# Patient Record
Sex: FEMALE | Race: WHITE | NOT HISPANIC OR LATINO | Employment: FULL TIME | URBAN - METROPOLITAN AREA
[De-identification: names, ages, dates, MRNs, and addresses within clinical notes are randomized per-mention and may not be internally consistent; named-entity substitution may affect disease eponyms.]

---

## 2017-05-11 ENCOUNTER — OFFICE VISIT (OUTPATIENT)
Dept: OBSTETRICS AND GYNECOLOGY | Facility: CLINIC | Age: 47
End: 2017-05-11

## 2017-05-11 VITALS
HEIGHT: 64 IN | BODY MASS INDEX: 26.46 KG/M2 | SYSTOLIC BLOOD PRESSURE: 120 MMHG | DIASTOLIC BLOOD PRESSURE: 70 MMHG | WEIGHT: 155 LBS

## 2017-05-11 DIAGNOSIS — N95.1 MENOPAUSAL SYNDROME (HOT FLUSHES): ICD-10-CM

## 2017-05-11 DIAGNOSIS — N87.0 DYSPLASIA OF CERVIX, LOW GRADE (CIN 1): Primary | ICD-10-CM

## 2017-05-11 PROCEDURE — 99213 OFFICE O/P EST LOW 20 MIN: CPT | Performed by: NURSE PRACTITIONER

## 2017-05-11 RX ORDER — HYDROXYZINE PAMOATE 25 MG/1
25 CAPSULE ORAL 3 TIMES DAILY
COMMUNITY
End: 2017-05-11 | Stop reason: SDUPTHER

## 2017-05-11 RX ORDER — LEVOTHYROXINE SODIUM 0.05 MG/1
50 TABLET ORAL
COMMUNITY
End: 2018-11-09

## 2017-05-25 ENCOUNTER — OFFICE VISIT (OUTPATIENT)
Dept: OBSTETRICS AND GYNECOLOGY | Facility: CLINIC | Age: 47
End: 2017-05-25

## 2017-05-25 VITALS
WEIGHT: 155 LBS | BODY MASS INDEX: 26.46 KG/M2 | HEIGHT: 64 IN | DIASTOLIC BLOOD PRESSURE: 78 MMHG | SYSTOLIC BLOOD PRESSURE: 126 MMHG

## 2017-05-25 DIAGNOSIS — R87.612 LOW GRADE SQUAMOUS INTRAEPITHELIAL LESION ON CYTOLOGIC SMEAR OF CERVIX (LGSIL): Primary | ICD-10-CM

## 2017-05-25 PROCEDURE — 58301 REMOVE INTRAUTERINE DEVICE: CPT | Performed by: NURSE PRACTITIONER

## 2017-05-25 PROCEDURE — 57455 BIOPSY OF CERVIX W/SCOPE: CPT | Performed by: NURSE PRACTITIONER

## 2017-05-31 LAB
DX ICD CODE: NORMAL
DX ICD CODE: NORMAL
PATH REPORT.FINAL DX SPEC: NORMAL
PATH REPORT.GROSS SPEC: NORMAL
PATH REPORT.SITE OF ORIGIN SPEC: NORMAL
PATHOLOGIST NAME: NORMAL
PAYMENT PROCEDURE: NORMAL

## 2017-06-02 LAB
HPV16 DNA CVX QL PROBE+SIG AMP: NEGATIVE
HPV18+45 E6+E7 MRNA CVX QL NAA+PROBE: NEGATIVE

## 2017-12-28 ENCOUNTER — OFFICE VISIT (OUTPATIENT)
Dept: OBSTETRICS AND GYNECOLOGY | Facility: CLINIC | Age: 47
End: 2017-12-28

## 2017-12-28 VITALS
SYSTOLIC BLOOD PRESSURE: 120 MMHG | HEIGHT: 64 IN | WEIGHT: 155 LBS | DIASTOLIC BLOOD PRESSURE: 70 MMHG | BODY MASS INDEX: 26.46 KG/M2

## 2017-12-28 DIAGNOSIS — N91.2 AMENORRHEA: Primary | ICD-10-CM

## 2017-12-28 PROCEDURE — 99213 OFFICE O/P EST LOW 20 MIN: CPT | Performed by: NURSE PRACTITIONER

## 2017-12-28 RX ORDER — NITROFURANTOIN 25; 75 MG/1; MG/1
CAPSULE ORAL
COMMUNITY
Start: 2017-12-27 | End: 2018-11-09

## 2017-12-28 RX ORDER — LEVOTHYROXINE SODIUM 0.07 MG/1
TABLET ORAL
COMMUNITY
Start: 2017-12-22 | End: 2019-10-16

## 2017-12-28 RX ORDER — ESCITALOPRAM OXALATE 10 MG/1
20 TABLET ORAL DAILY
Qty: 30 TABLET | Refills: 6 | Status: SHIPPED | OUTPATIENT
Start: 2017-12-28 | End: 2019-10-16

## 2017-12-28 RX ORDER — CYCLOBENZAPRINE HCL 5 MG
TABLET ORAL
COMMUNITY
Start: 2017-10-17 | End: 2018-11-09

## 2017-12-28 NOTE — PATIENT INSTRUCTIONS
Encounter time 20 min face to face counseling 15 min . We discussed menopause symptoms  And treat ment options

## 2017-12-28 NOTE — PROGRESS NOTES
Argelia Forte is a 47 y.o. female.   Chief Complaint   Patient presents with   • Gynecologic Exam     discuss hormones  having some hot flashes      HPI:pt has not had a period since her mirena iud was removed    The following portions of the patient's history were reviewed and updated as appropriate: allergies, current medications, past family history, past medical history, past social history, past surgical history and problem list.    Review of Systems  Review of Systems   Constitutional: Negative.    Genitourinary:        Amenorrhea   Allergic/Immunologic: Negative.    Psychiatric/Behavioral: The patient is nervous/anxious.        Objective   Physical Exam    Assessment/Plan   There are no Patient Instructions on file for this visit.    Argelia was seen today for gynecologic exam.    Diagnoses and all orders for this visit:    Amenorrhea        No Follow-up on file.

## 2017-12-29 LAB — FSH SERPL-ACNC: 59.2 MIU/ML

## 2018-02-28 ENCOUNTER — OFFICE VISIT (OUTPATIENT)
Dept: OBSTETRICS AND GYNECOLOGY | Facility: CLINIC | Age: 48
End: 2018-02-28

## 2018-02-28 VITALS
BODY MASS INDEX: 36.01 KG/M2 | WEIGHT: 155.6 LBS | SYSTOLIC BLOOD PRESSURE: 114 MMHG | HEIGHT: 55 IN | DIASTOLIC BLOOD PRESSURE: 62 MMHG

## 2018-02-28 DIAGNOSIS — Z78.0 MENOPAUSE: Primary | ICD-10-CM

## 2018-02-28 PROCEDURE — 99213 OFFICE O/P EST LOW 20 MIN: CPT | Performed by: NURSE PRACTITIONER

## 2018-02-28 RX ORDER — ESTRADIOL 1 MG/1
1 TABLET ORAL DAILY
Qty: 30 TABLET | Refills: 11 | Status: SHIPPED | OUTPATIENT
Start: 2018-02-28 | End: 2019-10-16

## 2018-02-28 NOTE — PATIENT INSTRUCTIONS
20 min encounter 15 min face to face discussing the risks and benefits  Of hrt, she has no known risks

## 2018-02-28 NOTE — PROGRESS NOTES
Argelia Forte is a 47 y.o. female.   Chief Complaint   Patient presents with   • Follow-up     Patient is here to discuss hormone labs.      HPI:pt here to discuss labs  And treatment options    The following portions of the patient's history were reviewed and updated as appropriate: allergies, current medications, past family history, past medical history, past social history, past surgical history and problem list.    Review of Systems  Review of Systems   Constitutional: Negative.    Genitourinary: Negative.    Allergic/Immunologic: Negative.    Psychiatric/Behavioral: Negative.        Objective   Physical Exam   Constitutional: She is oriented to person, place, and time. She appears well-developed and well-nourished.   Neurological: She is alert and oriented to person, place, and time.   Skin: Skin is warm.   Psychiatric: She has a normal mood and affect.   Nursing note and vitals reviewed.      Assessment/Plan   There are no Patient Instructions on file for this visit.    Argelia was seen today for follow-up.    Diagnoses and all orders for this visit:    Menopause        No Follow-up on file.

## 2019-06-25 ENCOUNTER — APPOINTMENT (OUTPATIENT)
Dept: WOMENS IMAGING | Facility: HOSPITAL | Age: 49
End: 2019-06-25

## 2019-06-25 PROCEDURE — 77067 SCR MAMMO BI INCL CAD: CPT | Performed by: RADIOLOGY

## 2019-10-16 ENCOUNTER — OFFICE VISIT (OUTPATIENT)
Dept: FAMILY MEDICINE CLINIC | Facility: CLINIC | Age: 49
End: 2019-10-16

## 2019-10-16 VITALS
HEIGHT: 64 IN | OXYGEN SATURATION: 97 % | WEIGHT: 166.2 LBS | BODY MASS INDEX: 28.38 KG/M2 | TEMPERATURE: 97.6 F | DIASTOLIC BLOOD PRESSURE: 74 MMHG | SYSTOLIC BLOOD PRESSURE: 116 MMHG | HEART RATE: 78 BPM

## 2019-10-16 DIAGNOSIS — S39.012A STRAIN OF LUMBAR PARASPINAL MUSCLE, INITIAL ENCOUNTER: Primary | ICD-10-CM

## 2019-10-16 PROCEDURE — 99213 OFFICE O/P EST LOW 20 MIN: CPT | Performed by: NURSE PRACTITIONER

## 2019-10-16 RX ORDER — CITALOPRAM 20 MG/1
TABLET ORAL
COMMUNITY
Start: 2019-10-14 | End: 2021-07-30

## 2019-10-16 NOTE — PATIENT INSTRUCTIONS
Low Back Sprain    A sprain is a stretch or tear in the bands of tissue that hold bones and joints together (ligaments). Sprains of the lower back (lumbar spine) are a common cause of low back pain. A sprain occurs when ligaments are overextended or stretched beyond their limits. The ligaments can become inflamed, resulting in pain and sudden muscle tightening (spasms). A sprain can be caused by an injury (trauma), or it can develop gradually due to overuse.  There are three types of sprains:  · Grade 1 is a mild sprain involving an overstretched ligament or a very slight tear of the ligament.  · Grade 2 is a moderate sprain involving a partial tear of the ligament.  · Grade 3 is a severe sprain involving a complete tear of the ligament.  What are the causes?  This condition may be caused by:  · Trauma, such as a fall or a hit to the body.  · Twisting or overstretching the back. This may result from doing activities that require a lot of energy, such as lifting heavy objects.  What increases the risk?  The following factors may increase your risk of getting this condition:  · Playing contact sports.  · Participating in sports or activities that put excessive stress on the back and require a lot of bending and twisting, including:  ? Lifting weights or heavy objects.  ? Gymnastics.  ? Soccer.  ? Figure skating.  ? Snowboarding.  · Being overweight or obese.  · Having poor strength and flexibility.  What are the signs or symptoms?  Symptoms of this condition may include:  · Sharp or dull pain in the lower back that does not go away. Pain may extend to the buttocks.  · Stiffness.  · Limited range of motion.  · Inability to stand up straight due to stiffness or pain.  · Muscle spasms.  How is this diagnosed?  This condition may be diagnosed based on:  · Your symptoms.  · Your medical history.  · A physical exam.  ? Your health care provider may push on certain areas of your back to determine the source of your  pain.  ? You may be asked to bend forward, backward, and side to side to assess the severity of your pain and your range of motion.  · Imaging tests, such as:  ? X-rays.  ? MRI.  How is this treated?  Treatment for this condition may include:  · Applying heat and cold to the affected area.  · Medicines to help relieve pain and to relax your muscles (muscle relaxants).  · NSAIDs to help reduce swelling and discomfort.  · Physical therapy.  When your symptoms improve, it is important to gradually return to your normal routine as soon as possible to reduce pain, avoid stiffness, and avoid loss of muscle strength. Generally, symptoms should improve within 6 weeks of treatment. However, recovery time varies.  Follow these instructions at home:  Managing pain, stiffness, and swelling    · If directed, apply ice to the injured area during the first 24 hours after your injury.  ? Put ice in a plastic bag.  ? Place a towel between your skin and the bag.  ? Leave the ice on for 20 minutes, 2-3 times a day.  · If directed, apply heat to the affected area as often as told by your health care provider. Use the heat source that your health care provider recommends, such as a moist heat pack or a heating pad.  ? Place a towel between your skin and the heat source.  ? Leave the heat on for 20-30 minutes.  ? Remove the heat if your skin turns bright red. This is especially important if you are unable to feel pain, heat, or cold. You may have a greater risk of getting burned.  Activity  · Rest and return to your normal activities as told by your health care provider. Ask your health care provider what activities are safe for you.  · Avoid activities that take a lot of effort (are strenuous) for as long as told by your health care provider.  · Do exercises as told by your health care provider.  General instructions    · Take over-the-counter and prescription medicines only as told by your health care provider.  · If you have questions  or concerns about safety while taking pain medicine, talk with your health care provider.  · Do not drive or operate heavy machinery until you know how your pain medicine affects you.  · Do not use any tobacco products, such as cigarettes, chewing tobacco, and e-cigarettes. Tobacco can delay bone healing. If you need help quitting, ask your health care provider.  · Keep all follow-up visits as told by your health care provider. This is important.  How is this prevented?  · Warm up and stretch before being active.  · Cool down and stretch after being active.  · Give your body time to rest between periods of activity.  · Avoid:  ? Being physically inactive for long periods at a time.  ? Exercising or playing sports when you are tired or in pain.  · Use correct form when playing sports and lifting heavy objects.  · Use good posture when sitting and standing.  · Maintain a healthy weight.  · Sleep on a mattress with medium firmness to support your back.  · Make sure to use equipment that fits you, including shoes that fit well.  · Be safe and responsible while being active to avoid falls.  · Do at least 150 minutes of moderate-intensity exercise each week, such as brisk walking or water aerobics. Try a form of exercise that takes stress off your back, such as swimming or stationary cycling.  · Maintain physical fitness, including:  ? Strength. In particular, develop and maintain strong abdominal muscles.  ? Flexibility.  ? Cardiovascular fitness.  ? Endurance.  Contact a health care provider if:  · Your back pain does not improve after 6 weeks of treatment.  · Your symptoms get worse.  Get help right away if:  · Your back pain is severe.  · You are unable to stand or walk.  · You develop pain in your legs.  · You develop weakness in your buttocks or legs.  · You have difficulty controlling when you urinate or when you have a bowel movement.  This information is not intended to replace advice given to you by your health  "care provider. Make sure you discuss any questions you have with your health care provider.  Document Released: 12/18/2006 Document Revised: 08/24/2017 Document Reviewed: 09/28/2016  [employer] Patient Education © 2019 [employer]      Low Back Sprain Rehab  Ask your health care provider which exercises are safe for you. Do exercises exactly as told by your health care provider and adjust them as directed. It is normal to feel mild stretching, pulling, tightness, or discomfort as you do these exercises, but you should stop right away if you feel sudden pain or your pain gets worse. Do not begin these exercises until told by your health care provider.  Stretching and range of motion exercises  These exercises warm up your muscles and joints and improve the movement and flexibility of your back. These exercises also help to relieve pain, numbness, and tingling.  Exercise A: Lumbar rotation    1. Lie on your back on a firm surface and bend your knees.  2. Straighten your arms out to your sides so each arm forms an \"L\" shape with a side of your body (a 90 degree angle).  3. Slowly move both of your knees to one side of your body until you feel a stretch in your lower back. Try not to let your shoulders move off of the floor.  4. Hold for __________ seconds.  5. Tense your abdominal muscles and slowly move your knees back to the starting position.  6. Repeat this exercise on the other side of your body.  Repeat __________ times. Complete this exercise __________ times a day.  Exercise B: Prone extension on elbows    1. Lie on your abdomen on a firm surface.  2. Prop yourself up on your elbows.  3. Use your arms to help lift your chest up until you feel a gentle stretch in your abdomen and your lower back.  ? This will place some of your body weight on your elbows. If this is uncomfortable, try stacking pillows under your chest.  ? Your hips should stay down, against the surface that you are lying on. Keep " your hip and back muscles relaxed.  4. Hold for __________ seconds.  5. Slowly relax your upper body and return to the starting position.  Repeat __________ times. Complete this exercise __________ times a day.  Strengthening exercises  These exercises build strength and endurance in your back. Endurance is the ability to use your muscles for a long time, even after they get tired.  Exercise C: Pelvic tilt  1. Lie on your back on a firm surface. Bend your knees and keep your feet flat.  2. Tense your abdominal muscles. Tip your pelvis up toward the ceiling and flatten your lower back into the floor.  ? To help with this exercise, you may place a small towel under your lower back and try to push your back into the towel.  3. Hold for __________ seconds.  4. Let your muscles relax completely before you repeat this exercise.  Repeat __________ times. Complete this exercise __________ times a day.  Exercise D: Alternating arm and leg raises    1. Get on your hands and knees on a firm surface. If you are on a hard floor, you may want to use padding to cushion your knees, such as an exercise mat.  2. Line up your arms and legs. Your hands should be below your shoulders, and your knees should be below your hips.  3. Lift your left leg behind you. At the same time, raise your right arm and straighten it in front of you.  ? Do not lift your leg higher than your hip.  ? Do not lift your arm higher than your shoulder.  ? Keep your abdominal and back muscles tight.  ? Keep your hips facing the ground.  ? Do not arch your back.  ? Keep your balance carefully, and do not hold your breath.  4. Hold for __________ seconds.  5. Slowly return to the starting position and repeat with your right leg and your left arm.  Repeat __________ times. Complete this exercise __________ times a day.  Exercise E: Abdominal set with straight leg raise    1. Lie on your back on a firm surface.  2. Bend one of your knees and keep your other leg  straight.  3. Tense your abdominal muscles and lift your straight leg up, 4-6 inches (10-15 cm) off the ground.  4. Keep your abdominal muscles tight and hold for __________ seconds.  ? Do not hold your breath.  ? Do not arch your back. Keep it flat against the ground.  5. Keep your abdominal muscles tense as you slowly lower your leg back to the starting position.  6. Repeat with your other leg.  Repeat __________ times. Complete this exercise __________ times a day.  Posture and body mechanics    Body mechanics refers to the movements and positions of your body while you do your daily activities. Posture is part of body mechanics. Good posture and healthy body mechanics can help to relieve stress in your body's tissues and joints. Good posture means that your spine is in its natural S-curve position (your spine is neutral), your shoulders are pulled back slightly, and your head is not tipped forward. The following are general guidelines for applying improved posture and body mechanics to your everyday activities.  Standing    · When standing, keep your spine neutral and your feet about hip-width apart. Keep a slight bend in your knees. Your ears, shoulders, and hips should line up.  · When you do a task in which you  one place for a long time, place one foot up on a stable object that is 2-4 inches (5-10 cm) high, such as a footstool. This helps keep your spine neutral.  Sitting    · When sitting, keep your spine neutral and keep your feet flat on the floor. Use a footrest, if necessary, and keep your thighs parallel to the floor. Avoid rounding your shoulders, and avoid tilting your head forward.  · When working at a desk or a computer, keep your desk at a height where your hands are slightly lower than your elbows. Slide your chair under your desk so you are close enough to maintain good posture.  · When working at a computer, place your monitor at a height where you are looking straight ahead and you do  not have to tilt your head forward or downward to look at the screen.  Resting    · When lying down and resting, avoid positions that are most painful for you.  · If you have pain with activities such as sitting, bending, stooping, or squatting (flexion-based activities), lie in a position in which your body does not bend very much. For example, avoid curling up on your side with your arms and knees near your chest (fetal position).  · If you have pain with activities such as standing for a long time or reaching with your arms (extension-based activities), lie with your spine in a neutral position and bend your knees slightly. Try the following positions:  · Lying on your side with a pillow between your knees.  · Lying on your back with a pillow under your knees.  Lifting    · When lifting objects, keep your feet at least shoulder-width apart and tighten your abdominal muscles.  · Bend your knees and hips and keep your spine neutral. It is important to lift using the strength of your legs, not your back. Do not lock your knees straight out.  · Always ask for help to lift heavy or awkward objects.  This information is not intended to replace advice given to you by your health care provider. Make sure you discuss any questions you have with your health care provider.  Document Released: 12/18/2006 Document Revised: 08/24/2017 Document Reviewed: 09/28/2016  [employer] Interactive Patient Education © 2019 [employer]        Use OTC ibuprofen 600mg BID   Continue use of Muscle relaxer's. Patient does not want to have a new prescription at this point.

## 2019-10-16 NOTE — PROGRESS NOTES
Subjective   Argelia Forte is a 49 y.o. female.     Chief Complaint   Patient presents with   • Back Pain     left lower side        History of Present Illness     This is my first time seeing this patient.She is a patient of Dr. Merrill.  She states she bent or twisted wrong picking up granddaughter on Saturday and it just isn't getting any better.   Pain is in Left lower side.   Denies any numbness or tingling down line.    Denies any trouble with bowel movements or urination.      Describes pain as a stiffness, sometimes worse than others.  Aching and tightness.  Rates pain at a 5 on scale of 1-10.    Alleviating factors: rest and ice  Aggravating factors: certain movements, getting out of bed. Movement of leg.    Touching it makes it worse.      otc pain patches and muscle relaxer.  Has used heat and ice.      The following portions of the patient's history were reviewed and updated as appropriate: allergies, current medications, past family history, past medical history, past social history, past surgical history and problem list.    Past Medical History:   Diagnosis Date   • Anemia    • Anxiety    • Depression    • Disease of thyroid gland    • LGSIL (low grade squamous intraepithelial dysplasia) 2016   • Menopausal symptom        Past Surgical History:   Procedure Laterality Date   •  SECTION      ; ;    • CRYOABLATION     • TUBAL ABDOMINAL LIGATION         Family History   Problem Relation Age of Onset   • Hyperlipidemia Mother    • Gallbladder disease Mother         GALLSTONES   • Hyperlipidemia Father    • Lung cancer Father    • Gallbladder disease Sister         gallstones   • No Known Problems Son    • No Known Problems Daughter    • No Known Problems Son        Social History     Socioeconomic History   • Marital status: Single     Spouse name: Not on file   • Number of children: 3   • Years of education: 12   • Highest education level: Not on file   Occupational History  "  • Occupation:      Employer: [employer]   Tobacco Use   • Smoking status: Never Smoker   • Smokeless tobacco: Never Used   Substance and Sexual Activity   • Alcohol use: No   • Drug use: No   • Sexual activity: Yes     Partners: Male     Comment: Mirena 11/23/11   Social History Narrative    OB/GYN PATIENT SINCE 2005.         Current Outpatient Medications:   •  citalopram (CeleXA) 20 MG tablet, , Disp: , Rfl:   •  hydrOXYzine (ATARAX) 25 MG tablet, Take 25 mg by mouth., Disp: , Rfl:   •  meclizine (ANTIVERT) 25 MG tablet, Take 1 tablet by mouth 3 (Three) Times a Day As Needed for dizziness., Disp: 20 tablet, Rfl: 0    Review of Systems   Constitutional: Negative for fatigue and fever.   Respiratory: Negative for cough, shortness of breath and wheezing.    Cardiovascular: Negative for chest pain.   Musculoskeletal: Positive for back pain (L lower perispinal area). Negative for neck pain.       Objective   Vitals:    10/16/19 1451   BP: 116/74   Pulse: 78   Temp: 97.6 °F (36.4 °C)   SpO2: 97%   Weight: 75.4 kg (166 lb 3.2 oz)   Height: 162.6 cm (64\")     Body mass index is 28.53 kg/m².  Physical Exam   Constitutional: She appears well-developed and well-nourished.   Cardiovascular: Normal rate, regular rhythm and normal heart sounds.   Pulmonary/Chest: Effort normal and breath sounds normal.   Musculoskeletal:        Cervical back: Normal.        Thoracic back: Normal.        Lumbar back: She exhibits tenderness (in R perispinal muscles with ROM) and spasm. She exhibits normal range of motion.         Assessment/Plan   Argelia was seen today for back pain.    Diagnoses and all orders for this visit:    Strain of lumbar paraspinal muscle, initial encounter                 Patient Instructions   Low Back Sprain    A sprain is a stretch or tear in the bands of tissue that hold bones and joints together (ligaments). Sprains of the lower back (lumbar spine) are a common cause of low back pain. A " sprain occurs when ligaments are overextended or stretched beyond their limits. The ligaments can become inflamed, resulting in pain and sudden muscle tightening (spasms). A sprain can be caused by an injury (trauma), or it can develop gradually due to overuse.  There are three types of sprains:  · Grade 1 is a mild sprain involving an overstretched ligament or a very slight tear of the ligament.  · Grade 2 is a moderate sprain involving a partial tear of the ligament.  · Grade 3 is a severe sprain involving a complete tear of the ligament.  What are the causes?  This condition may be caused by:  · Trauma, such as a fall or a hit to the body.  · Twisting or overstretching the back. This may result from doing activities that require a lot of energy, such as lifting heavy objects.  What increases the risk?  The following factors may increase your risk of getting this condition:  · Playing contact sports.  · Participating in sports or activities that put excessive stress on the back and require a lot of bending and twisting, including:  ? Lifting weights or heavy objects.  ? Gymnastics.  ? Soccer.  ? Figure skating.  ? Snowboarding.  · Being overweight or obese.  · Having poor strength and flexibility.  What are the signs or symptoms?  Symptoms of this condition may include:  · Sharp or dull pain in the lower back that does not go away. Pain may extend to the buttocks.  · Stiffness.  · Limited range of motion.  · Inability to stand up straight due to stiffness or pain.  · Muscle spasms.  How is this diagnosed?  This condition may be diagnosed based on:  · Your symptoms.  · Your medical history.  · A physical exam.  ? Your health care provider may push on certain areas of your back to determine the source of your pain.  ? You may be asked to bend forward, backward, and side to side to assess the severity of your pain and your range of motion.  · Imaging tests, such as:  ? X-rays.  ? MRI.  How is this treated?  Treatment  for this condition may include:  · Applying heat and cold to the affected area.  · Medicines to help relieve pain and to relax your muscles (muscle relaxants).  · NSAIDs to help reduce swelling and discomfort.  · Physical therapy.  When your symptoms improve, it is important to gradually return to your normal routine as soon as possible to reduce pain, avoid stiffness, and avoid loss of muscle strength. Generally, symptoms should improve within 6 weeks of treatment. However, recovery time varies.  Follow these instructions at home:  Managing pain, stiffness, and swelling    · If directed, apply ice to the injured area during the first 24 hours after your injury.  ? Put ice in a plastic bag.  ? Place a towel between your skin and the bag.  ? Leave the ice on for 20 minutes, 2-3 times a day.  · If directed, apply heat to the affected area as often as told by your health care provider. Use the heat source that your health care provider recommends, such as a moist heat pack or a heating pad.  ? Place a towel between your skin and the heat source.  ? Leave the heat on for 20-30 minutes.  ? Remove the heat if your skin turns bright red. This is especially important if you are unable to feel pain, heat, or cold. You may have a greater risk of getting burned.  Activity  · Rest and return to your normal activities as told by your health care provider. Ask your health care provider what activities are safe for you.  · Avoid activities that take a lot of effort (are strenuous) for as long as told by your health care provider.  · Do exercises as told by your health care provider.  General instructions    · Take over-the-counter and prescription medicines only as told by your health care provider.  · If you have questions or concerns about safety while taking pain medicine, talk with your health care provider.  · Do not drive or operate heavy machinery until you know how your pain medicine affects you.  · Do not use any tobacco  products, such as cigarettes, chewing tobacco, and e-cigarettes. Tobacco can delay bone healing. If you need help quitting, ask your health care provider.  · Keep all follow-up visits as told by your health care provider. This is important.  How is this prevented?  · Warm up and stretch before being active.  · Cool down and stretch after being active.  · Give your body time to rest between periods of activity.  · Avoid:  ? Being physically inactive for long periods at a time.  ? Exercising or playing sports when you are tired or in pain.  · Use correct form when playing sports and lifting heavy objects.  · Use good posture when sitting and standing.  · Maintain a healthy weight.  · Sleep on a mattress with medium firmness to support your back.  · Make sure to use equipment that fits you, including shoes that fit well.  · Be safe and responsible while being active to avoid falls.  · Do at least 150 minutes of moderate-intensity exercise each week, such as brisk walking or water aerobics. Try a form of exercise that takes stress off your back, such as swimming or stationary cycling.  · Maintain physical fitness, including:  ? Strength. In particular, develop and maintain strong abdominal muscles.  ? Flexibility.  ? Cardiovascular fitness.  ? Endurance.  Contact a health care provider if:  · Your back pain does not improve after 6 weeks of treatment.  · Your symptoms get worse.  Get help right away if:  · Your back pain is severe.  · You are unable to stand or walk.  · You develop pain in your legs.  · You develop weakness in your buttocks or legs.  · You have difficulty controlling when you urinate or when you have a bowel movement.  This information is not intended to replace advice given to you by your health care provider. Make sure you discuss any questions you have with your health care provider.  Document Released: 12/18/2006 Document Revised: 08/24/2017 Document Reviewed: 09/28/2016  [employer]  "Patient Education © 2019 [employer]      Low Back Sprain Rehab  Ask your health care provider which exercises are safe for you. Do exercises exactly as told by your health care provider and adjust them as directed. It is normal to feel mild stretching, pulling, tightness, or discomfort as you do these exercises, but you should stop right away if you feel sudden pain or your pain gets worse. Do not begin these exercises until told by your health care provider.  Stretching and range of motion exercises  These exercises warm up your muscles and joints and improve the movement and flexibility of your back. These exercises also help to relieve pain, numbness, and tingling.  Exercise A: Lumbar rotation    1. Lie on your back on a firm surface and bend your knees.  2. Straighten your arms out to your sides so each arm forms an \"L\" shape with a side of your body (a 90 degree angle).  3. Slowly move both of your knees to one side of your body until you feel a stretch in your lower back. Try not to let your shoulders move off of the floor.  4. Hold for __________ seconds.  5. Tense your abdominal muscles and slowly move your knees back to the starting position.  6. Repeat this exercise on the other side of your body.  Repeat __________ times. Complete this exercise __________ times a day.  Exercise B: Prone extension on elbows    1. Lie on your abdomen on a firm surface.  2. Prop yourself up on your elbows.  3. Use your arms to help lift your chest up until you feel a gentle stretch in your abdomen and your lower back.  ? This will place some of your body weight on your elbows. If this is uncomfortable, try stacking pillows under your chest.  ? Your hips should stay down, against the surface that you are lying on. Keep your hip and back muscles relaxed.  4. Hold for __________ seconds.  5. Slowly relax your upper body and return to the starting position.  Repeat __________ times. Complete this exercise __________ times a " day.  Strengthening exercises  These exercises build strength and endurance in your back. Endurance is the ability to use your muscles for a long time, even after they get tired.  Exercise C: Pelvic tilt  1. Lie on your back on a firm surface. Bend your knees and keep your feet flat.  2. Tense your abdominal muscles. Tip your pelvis up toward the ceiling and flatten your lower back into the floor.  ? To help with this exercise, you may place a small towel under your lower back and try to push your back into the towel.  3. Hold for __________ seconds.  4. Let your muscles relax completely before you repeat this exercise.  Repeat __________ times. Complete this exercise __________ times a day.  Exercise D: Alternating arm and leg raises    1. Get on your hands and knees on a firm surface. If you are on a hard floor, you may want to use padding to cushion your knees, such as an exercise mat.  2. Line up your arms and legs. Your hands should be below your shoulders, and your knees should be below your hips.  3. Lift your left leg behind you. At the same time, raise your right arm and straighten it in front of you.  ? Do not lift your leg higher than your hip.  ? Do not lift your arm higher than your shoulder.  ? Keep your abdominal and back muscles tight.  ? Keep your hips facing the ground.  ? Do not arch your back.  ? Keep your balance carefully, and do not hold your breath.  4. Hold for __________ seconds.  5. Slowly return to the starting position and repeat with your right leg and your left arm.  Repeat __________ times. Complete this exercise __________ times a day.  Exercise E: Abdominal set with straight leg raise    1. Lie on your back on a firm surface.  2. Bend one of your knees and keep your other leg straight.  3. Tense your abdominal muscles and lift your straight leg up, 4-6 inches (10-15 cm) off the ground.  4. Keep your abdominal muscles tight and hold for __________ seconds.  ? Do not hold your  breath.  ? Do not arch your back. Keep it flat against the ground.  5. Keep your abdominal muscles tense as you slowly lower your leg back to the starting position.  6. Repeat with your other leg.  Repeat __________ times. Complete this exercise __________ times a day.  Posture and body mechanics    Body mechanics refers to the movements and positions of your body while you do your daily activities. Posture is part of body mechanics. Good posture and healthy body mechanics can help to relieve stress in your body's tissues and joints. Good posture means that your spine is in its natural S-curve position (your spine is neutral), your shoulders are pulled back slightly, and your head is not tipped forward. The following are general guidelines for applying improved posture and body mechanics to your everyday activities.  Standing    · When standing, keep your spine neutral and your feet about hip-width apart. Keep a slight bend in your knees. Your ears, shoulders, and hips should line up.  · When you do a task in which you  one place for a long time, place one foot up on a stable object that is 2-4 inches (5-10 cm) high, such as a footstool. This helps keep your spine neutral.  Sitting    · When sitting, keep your spine neutral and keep your feet flat on the floor. Use a footrest, if necessary, and keep your thighs parallel to the floor. Avoid rounding your shoulders, and avoid tilting your head forward.  · When working at a desk or a computer, keep your desk at a height where your hands are slightly lower than your elbows. Slide your chair under your desk so you are close enough to maintain good posture.  · When working at a computer, place your monitor at a height where you are looking straight ahead and you do not have to tilt your head forward or downward to look at the screen.  Resting    · When lying down and resting, avoid positions that are most painful for you.  · If you have pain with activities such  as sitting, bending, stooping, or squatting (flexion-based activities), lie in a position in which your body does not bend very much. For example, avoid curling up on your side with your arms and knees near your chest (fetal position).  · If you have pain with activities such as standing for a long time or reaching with your arms (extension-based activities), lie with your spine in a neutral position and bend your knees slightly. Try the following positions:  · Lying on your side with a pillow between your knees.  · Lying on your back with a pillow under your knees.  Lifting    · When lifting objects, keep your feet at least shoulder-width apart and tighten your abdominal muscles.  · Bend your knees and hips and keep your spine neutral. It is important to lift using the strength of your legs, not your back. Do not lock your knees straight out.  · Always ask for help to lift heavy or awkward objects.  This information is not intended to replace advice given to you by your health care provider. Make sure you discuss any questions you have with your health care provider.  Document Released: 12/18/2006 Document Revised: 08/24/2017 Document Reviewed: 09/28/2016  [employer] Patient Education © 2019 [employer]        Use OTC ibuprofen 600mg BID   Continue use of Muscle relaxer's. Patient does not want to have a new prescription at this point.

## 2020-04-24 ENCOUNTER — TELEPHONE (OUTPATIENT)
Dept: FAMILY MEDICINE CLINIC | Facility: CLINIC | Age: 50
End: 2020-04-24

## 2020-04-24 RX ORDER — LEVOTHYROXINE SODIUM 88 UG/1
TABLET ORAL
Qty: 90 TABLET | Refills: 0 | OUTPATIENT
Start: 2020-04-24

## 2020-11-04 ENCOUNTER — OFFICE VISIT (OUTPATIENT)
Dept: FAMILY MEDICINE CLINIC | Facility: CLINIC | Age: 50
End: 2020-11-04

## 2020-11-04 VITALS
HEIGHT: 64 IN | OXYGEN SATURATION: 98 % | BODY MASS INDEX: 29.19 KG/M2 | DIASTOLIC BLOOD PRESSURE: 82 MMHG | TEMPERATURE: 97.7 F | SYSTOLIC BLOOD PRESSURE: 124 MMHG | WEIGHT: 171 LBS | HEART RATE: 71 BPM

## 2020-11-04 DIAGNOSIS — M26.621 ARTHRALGIA OF RIGHT TEMPOROMANDIBULAR JOINT: Primary | ICD-10-CM

## 2020-11-04 DIAGNOSIS — M12.811 ROTATOR CUFF ARTHROPATHY, RIGHT: ICD-10-CM

## 2020-11-04 PROCEDURE — 99213 OFFICE O/P EST LOW 20 MIN: CPT | Performed by: FAMILY MEDICINE

## 2020-11-04 NOTE — PROGRESS NOTES
Subjective   Argelia Forte is a 50 y.o. female.     Chief Complaint   Patient presents with   • Earache     Right Ear   • Shoulder Pain     Right Shoulder       Patient is here today with a new problem.  She started with right ear pain about 2 weeks ago.  She states that it has been intermittent and she has noticed a lot of drainage.  She denies any fevers, chills or any other upper respiratory symptoms.  She denies any hearing loss or ringing in her ears.  She states that it does hurt worse when she is chewing gum.    She is also having another new problem.  She started with right shoulder pain a couple weeks ago.  It has been intermittent.  It mostly hurts when she reaches overhead or in front of her.  It also hurts when she is lifting something with her right arm.  She denies any pain at rest.  She denies any redness or swelling or known trauma or injury.       Review of Systems   Constitutional: Negative for activity change, chills, fatigue and fever.   HENT: Positive for ear pain (right). Negative for hearing loss, swollen glands, tinnitus and trouble swallowing.    Eyes: Negative for pain and visual disturbance.   Respiratory: Negative for cough and shortness of breath.    Cardiovascular: Negative for chest pain, palpitations and leg swelling.   Gastrointestinal: Negative for diarrhea and nausea.   Endocrine: Negative for polydipsia and polyuria.   Genitourinary: Negative for difficulty urinating and urinary incontinence.   Musculoskeletal: Negative for arthralgias, gait problem and joint swelling.   Skin: Negative for rash.   Allergic/Immunologic: Negative for immunocompromised state.   Neurological: Negative for dizziness, light-headedness and headache.   Hematological: Negative for adenopathy. Does not bruise/bleed easily.   Psychiatric/Behavioral: Negative for dysphoric mood and sleep disturbance.       The following portions of the patient's history were reviewed and updated as appropriate: allergies,  "current medications, past family history, past medical history, past social history, past surgical history and problem list.    Past Medical History:   Diagnosis Date   • Anemia    • Anxiety    • Depression    • Disease of thyroid gland    • LGSIL (low grade squamous intraepithelial dysplasia) 2016   • Menopausal symptom        Past Surgical History:   Procedure Laterality Date   •  SECTION      ; ;    • CRYOABLATION     • TUBAL ABDOMINAL LIGATION         Family History   Problem Relation Age of Onset   • Hyperlipidemia Mother    • Gallbladder disease Mother         GALLSTONES   • Hyperlipidemia Father    • Lung cancer Father    • Gallbladder disease Sister         gallstones   • No Known Problems Son    • No Known Problems Daughter    • No Known Problems Son        Social History     Socioeconomic History   • Marital status: Single     Spouse name: Not on file   • Number of children: 3   • Years of education: 12   • Highest education level: Not on file   Occupational History   • Occupation:      Employer: [employer]   Tobacco Use   • Smoking status: Never Smoker   • Smokeless tobacco: Never Used   Substance and Sexual Activity   • Alcohol use: No   • Drug use: No   • Sexual activity: Yes     Partners: Male     Comment: Mirena 9   Social History Narrative    OB/GYN PATIENT SINCE .         Current Outpatient Medications:   •  citalopram (CeleXA) 20 MG tablet, , Disp: , Rfl:   •  hydrOXYzine (ATARAX) 25 MG tablet, Take 25 mg by mouth., Disp: , Rfl:   •  meclizine (ANTIVERT) 25 MG tablet, Take 1 tablet by mouth 3 (Three) Times a Day As Needed for dizziness., Disp: 20 tablet, Rfl: 0    Objective     Vitals:    20 1005   BP: 124/82   Pulse: 71   Temp: 97.7 °F (36.5 °C)   SpO2: 98%   Weight: 77.6 kg (171 lb)   Height: 162.6 cm (64\")       Body mass index is 29.35 kg/m².    No components found for: 2D    Physical Exam  Vitals signs and nursing " note reviewed.   Constitutional:       Appearance: She is well-developed.   HENT:      Head: Normocephalic and atraumatic.      Right Ear: External ear normal.      Left Ear: External ear normal.      Nose: Nose normal.   Eyes:      General: No scleral icterus.     Conjunctiva/sclera: Conjunctivae normal.   Neck:      Musculoskeletal: Normal range of motion and neck supple.   Cardiovascular:      Rate and Rhythm: Normal rate and regular rhythm.      Heart sounds: Normal heart sounds.   Pulmonary:      Effort: Pulmonary effort is normal.      Breath sounds: Normal breath sounds.   Lymphadenopathy:      Cervical: No cervical adenopathy.   Skin:     General: Skin is warm and dry.      Findings: No rash.   Neurological:      Mental Status: She is alert and oriented to person, place, and time.   Psychiatric:         Mood and Affect: Mood normal.         Behavior: Behavior normal.         Thought Content: Thought content normal.         Judgment: Judgment normal.         Procedures    Assessment/Plan   Diagnoses and all orders for this visit:    1. Arthralgia of right temporomandibular joint (Primary)    2. Rotator cuff arthropathy, right    I have advised the patient to do daily strengthening exercises for her right shoulder pain.  If her symptoms fail to improve or worsen she should contact me.    I have also advised the patient to use ice and discuss TMJ with her dentist and consider a bite guard if her right ear and jaw pain continue.  She may use over-the-counter anti-inflammatory medications as well.    Patient Instructions   Temporomandibular Joint Syndrome    Temporomandibular joint syndrome (TMJ syndrome) is a condition that causes pain in the temporomandibular joints. These joints are located near your ears and allow your jaw to open and close. For people with TMJ syndrome, chewing, biting, or other movements of the jaw can be difficult or painful.  TMJ syndrome is often mild and goes away within a few weeks.  However, sometimes the condition becomes a long-term (chronic) problem.  What are the causes?  This condition may be caused by:  · Grinding your teeth or clenching your jaw. Some people do this when they are under stress.  · Arthritis.  · Injury to the jaw.  · Head or neck injury.  · Teeth or dentures that are not aligned well.  In some cases, the cause of TMJ syndrome may not be known.  What are the signs or symptoms?  The most common symptom of this condition is an aching pain on the side of the head in the area of the TMJ. Other symptoms may include:  · Pain when moving your jaw, such as when chewing or biting.  · Being unable to open your jaw all the way.  · Making a clicking sound when you open your mouth.  · Headache.  · Earache.  · Neck or shoulder pain.  How is this diagnosed?  This condition may be diagnosed based on:  · Your symptoms and medical history.  · A physical exam. Your health care provider may check the range of motion of your jaw.  · Imaging tests, such as X-rays or an MRI.  You may also need to see your dentist, who will determine if your teeth and jaw are lined up correctly.  How is this treated?  TMJ syndrome often goes away on its own. If treatment is needed, the options may include:  · Eating soft foods and applying ice or heat.  · Medicines to relieve pain or inflammation.  · Medicines or massage to relax the muscles.  · A splint, bite plate, or mouthpiece to prevent teeth grinding or jaw clenching.  · Relaxation techniques or counseling to help reduce stress.  · A therapy for pain in which an electrical current is applied to the nerves through the skin (transcutaneous electrical nerve stimulation).  · Acupuncture. This is sometimes helpful to relieve pain.  · Jaw surgery. This is rarely needed.  Follow these instructions at home:    Eating and drinking  · Eat a soft diet if you are having trouble chewing.  · Avoid foods that require a lot of chewing. Do not chew gum.  General  instructions  · Take over-the-counter and prescription medicines only as told by your health care provider.  · If directed, put ice on the painful area.  ? Put ice in a plastic bag.  ? Place a towel between your skin and the bag.  ? Leave the ice on for 20 minutes, 2-3 times a day.  · Apply a warm, wet cloth (warm compress) to the painful area as directed.  · Massage your jaw area and do any jaw stretching exercises as told by your health care provider.  · If you were given a splint, bite plate, or mouthpiece, wear it as told by your health care provider.  · Keep all follow-up visits as told by your health care provider. This is important.  Contact a health care provider if:  · You are having trouble eating.  · You have new or worsening symptoms.  Get help right away if:  · Your jaw locks open or closed.  Summary  · Temporomandibular joint syndrome (TMJ syndrome) is a condition that causes pain in the temporomandibular joints. These joints are located near your ears and allow your jaw to open and close.  · TMJ syndrome is often mild and goes away within a few weeks. However, sometimes the condition becomes a long-term (chronic) problem.  · Symptoms include an aching pain on the side of the head in the area of the TMJ, pain when chewing or biting, and being unable to open your jaw all the way. You may also make a clicking sound when you open your mouth.  · TMJ syndrome often goes away on its own. If treatment is needed, it may include medicines to relieve pain, reduce inflammation, or relax the muscles. A splint, bite plate, or mouthpiece may also be used to prevent teeth grinding or jaw clenching.  This information is not intended to replace advice given to you by your health care provider. Make sure you discuss any questions you have with your health care provider.  Document Released: 09/12/2002 Document Revised: 03/01/2019 Document Reviewed: 01/29/2019  [employer]vier Patient Education © 2020 [employer]  Shoulder  Exercises  Ask your health care provider which exercises are safe for you. Do exercises exactly as told by your health care provider and adjust them as directed. It is normal to feel mild stretching, pulling, tightness, or discomfort as you do these exercises. Stop right away if you feel sudden pain or your pain gets worse. Do not begin these exercises until told by your health care provider.  Stretching exercises  External rotation and abduction  This exercise is sometimes called corner stretch. This exercise rotates your arm outward (external rotation) and moves your arm out from your body (abduction).  1.  a doorway with one of your feet slightly in front of the other. This is called a staggered stance. If you cannot reach your forearms to the door frame, stand facing a corner of a room.  2. Choose one of the following positions as told by your health care provider:  ? Place your hands and forearms on the door frame above your head.  ? Place your hands and forearms on the door frame at the height of your head.  ? Place your hands on the door frame at the height of your elbows.  3. Slowly move your weight onto your front foot until you feel a stretch across your chest and in the front of your shoulders. Keep your head and chest upright and keep your abdominal muscles tight.  4. Hold for __________ seconds.  5. To release the stretch, shift your weight to your back foot.  Repeat __________ times. Complete this exercise __________ times a day.  Extension, standing  1. Stand and hold a broomstick, a cane, or a similar object behind your back.  ? Your hands should be a little wider than shoulder width apart.  ? Your palms should face away from your back.  2. Keeping your elbows straight and your shoulder muscles relaxed, move the stick away from your body until you feel a stretch in your shoulders (extension).  ? Avoid shrugging your shoulders while you move the stick. Keep your shoulder blades tucked down  toward the middle of your back.  3. Hold for __________ seconds.  4. Slowly return to the starting position.  Repeat __________ times. Complete this exercise __________ times a day.  Range-of-motion exercises  Pendulum    1. Stand near a wall or a surface that you can hold onto for balance.  2. Bend at the waist and let your left / right arm hang straight down. Use your other arm to support you. Keep your back straight and do not lock your knees.  3. Relax your left / right arm and shoulder muscles, and move your hips and your trunk so your left / right arm swings freely. Your arm should swing because of the motion of your body, not because you are using your arm or shoulder muscles.  4. Keep moving your hips and trunk so your arm swings in the following directions, as told by your health care provider:  ? Side to side.  ? Forward and backward.  ? In clockwise and counterclockwise circles.  5. Continue each motion for __________ seconds, or for as long as told by your health care provider.  6. Slowly return to the starting position.  Repeat __________ times. Complete this exercise __________ times a day.  Shoulder flexion, standing    1. Stand and hold a broomstick, a cane, or a similar object. Place your hands a little more than shoulder width apart on the object. Your left / right hand should be palm up, and your other hand should be palm down.  2. Keep your elbow straight and your shoulder muscles relaxed. Push the stick up with your healthy arm to raise your left / right arm in front of your body, and then over your head until you feel a stretch in your shoulder (flexion).  ? Avoid shrugging your shoulder while you raise your arm. Keep your shoulder blade tucked down toward the middle of your back.  3. Hold for __________ seconds.  4. Slowly return to the starting position.  Repeat __________ times. Complete this exercise __________ times a day.  Shoulder abduction, standing  1. Stand and hold a broomstick, a  cane, or a similar object. Place your hands a little more than shoulder width apart on the object. Your left / right hand should be palm up, and your other hand should be palm down.  2. Keep your elbow straight and your shoulder muscles relaxed. Push the object across your body toward your left / right side. Raise your left / right arm to the side of your body (abduction) until you feel a stretch in your shoulder.  ? Do not raise your arm above shoulder height unless your health care provider tells you to do that.  ? If directed, raise your arm over your head.  ? Avoid shrugging your shoulder while you raise your arm. Keep your shoulder blade tucked down toward the middle of your back.  3. Hold for __________ seconds.  4. Slowly return to the starting position.  Repeat __________ times. Complete this exercise __________ times a day.  Internal rotation    1. Place your left / right hand behind your back, palm up.  2. Use your other hand to dangle an exercise band, a towel, or a similar object over your shoulder. Grasp the band with your left / right hand so you are holding on to both ends.  3. Gently pull up on the band until you feel a stretch in the front of your left / right shoulder. The movement of your arm toward the center of your body is called internal rotation.  ? Avoid shrugging your shoulder while you raise your arm. Keep your shoulder blade tucked down toward the middle of your back.  4. Hold for __________ seconds.  5. Release the stretch by letting go of the band and lowering your hands.  Repeat __________ times. Complete this exercise __________ times a day.  Strengthening exercises  External rotation    1. Sit in a stable chair without armrests.  2. Secure an exercise band to a stable object at elbow height on your left / right side.  3. Place a soft object, such as a folded towel or a small pillow, between your left / right upper arm and your body to move your elbow about 4 inches (10 cm) away from  your side.  4. Hold the end of the exercise band so it is tight and there is no slack.  5. Keeping your elbow pressed against the soft object, slowly move your forearm out, away from your abdomen (external rotation). Keep your body steady so only your forearm moves.  6. Hold for __________ seconds.  7. Slowly return to the starting position.  Repeat __________ times. Complete this exercise __________ times a day.  Shoulder abduction    1. Sit in a stable chair without armrests, or stand up.  2. Hold a __________ weight in your left / right hand, or hold an exercise band with both hands.  3. Start with your arms straight down and your left / right palm facing in, toward your body.  4. Slowly lift your left / right hand out to your side (abduction). Do not lift your hand above shoulder height unless your health care provider tells you that this is safe.  ? Keep your arms straight.  ? Avoid shrugging your shoulder while you do this movement. Keep your shoulder blade tucked down toward the middle of your back.  5. Hold for __________ seconds.  6. Slowly lower your arm, and return to the starting position.  Repeat __________ times. Complete this exercise __________ times a day.  Shoulder extension  1. Sit in a stable chair without armrests, or stand up.  2. Secure an exercise band to a stable object in front of you so it is at shoulder height.  3. Hold one end of the exercise band in each hand. Your palms should face each other.  4. Straighten your elbows and lift your hands up to shoulder height.  5. Step back, away from the secured end of the exercise band, until the band is tight and there is no slack.  6. Squeeze your shoulder blades together as you pull your hands down to the sides of your thighs (extension). Stop when your hands are straight down by your sides. Do not let your hands go behind your body.  7. Hold for __________ seconds.  8. Slowly return to the starting position.  Repeat __________ times. Complete  this exercise __________ times a day.  Shoulder row  1. Sit in a stable chair without armrests, or stand up.  2. Secure an exercise band to a stable object in front of you so it is at waist height.  3. Hold one end of the exercise band in each hand. Position your palms so that your thumbs are facing the ceiling (neutral position).  4. Bend each of your elbows to a 90-degree angle (right angle) and keep your upper arms at your sides.  5. Step back until the band is tight and there is no slack.  6. Slowly pull your elbows back behind you.  7. Hold for __________ seconds.  8. Slowly return to the starting position.  Repeat __________ times. Complete this exercise __________ times a day.  Shoulder press-ups    1. Sit in a stable chair that has armrests. Sit upright, with your feet flat on the floor.  2. Put your hands on the armrests so your elbows are bent and your fingers are pointing forward. Your hands should be about even with the sides of your body.  3. Push down on the armrests and use your arms to lift yourself off the chair. Straighten your elbows and lift yourself up as much as you comfortably can.  ? Move your shoulder blades down, and avoid letting your shoulders move up toward your ears.  ? Keep your feet on the ground. As you get stronger, your feet should support less of your body weight as you lift yourself up.  4. Hold for __________ seconds.  5. Slowly lower yourself back into the chair.  Repeat __________ times. Complete this exercise __________ times a day.  Wall push-ups    1. Stand so you are facing a stable wall. Your feet should be about one arm-length away from the wall.  2. Lean forward and place your palms on the wall at shoulder height.  3. Keep your feet flat on the floor as you bend your elbows and lean forward toward the wall.  4. Hold for __________ seconds.  5. Straighten your elbows to push yourself back to the starting position.  Repeat __________ times. Complete this exercise  __________ times a day.  This information is not intended to replace advice given to you by your health care provider. Make sure you discuss any questions you have with your health care provider.  Document Released: 11/01/2006 Document Revised: 04/10/2020 Document Reviewed: 01/17/2020  [employer] Patient Education © 2020 [employer]

## 2020-11-04 NOTE — PATIENT INSTRUCTIONS
Temporomandibular Joint Syndrome    Temporomandibular joint syndrome (TMJ syndrome) is a condition that causes pain in the temporomandibular joints. These joints are located near your ears and allow your jaw to open and close. For people with TMJ syndrome, chewing, biting, or other movements of the jaw can be difficult or painful.  TMJ syndrome is often mild and goes away within a few weeks. However, sometimes the condition becomes a long-term (chronic) problem.  What are the causes?  This condition may be caused by:  · Grinding your teeth or clenching your jaw. Some people do this when they are under stress.  · Arthritis.  · Injury to the jaw.  · Head or neck injury.  · Teeth or dentures that are not aligned well.  In some cases, the cause of TMJ syndrome may not be known.  What are the signs or symptoms?  The most common symptom of this condition is an aching pain on the side of the head in the area of the TMJ. Other symptoms may include:  · Pain when moving your jaw, such as when chewing or biting.  · Being unable to open your jaw all the way.  · Making a clicking sound when you open your mouth.  · Headache.  · Earache.  · Neck or shoulder pain.  How is this diagnosed?  This condition may be diagnosed based on:  · Your symptoms and medical history.  · A physical exam. Your health care provider may check the range of motion of your jaw.  · Imaging tests, such as X-rays or an MRI.  You may also need to see your dentist, who will determine if your teeth and jaw are lined up correctly.  How is this treated?  TMJ syndrome often goes away on its own. If treatment is needed, the options may include:  · Eating soft foods and applying ice or heat.  · Medicines to relieve pain or inflammation.  · Medicines or massage to relax the muscles.  · A splint, bite plate, or mouthpiece to prevent teeth grinding or jaw clenching.  · Relaxation techniques or counseling to help reduce stress.  · A therapy for pain in which an  electrical current is applied to the nerves through the skin (transcutaneous electrical nerve stimulation).  · Acupuncture. This is sometimes helpful to relieve pain.  · Jaw surgery. This is rarely needed.  Follow these instructions at home:    Eating and drinking  · Eat a soft diet if you are having trouble chewing.  · Avoid foods that require a lot of chewing. Do not chew gum.  General instructions  · Take over-the-counter and prescription medicines only as told by your health care provider.  · If directed, put ice on the painful area.  ? Put ice in a plastic bag.  ? Place a towel between your skin and the bag.  ? Leave the ice on for 20 minutes, 2-3 times a day.  · Apply a warm, wet cloth (warm compress) to the painful area as directed.  · Massage your jaw area and do any jaw stretching exercises as told by your health care provider.  · If you were given a splint, bite plate, or mouthpiece, wear it as told by your health care provider.  · Keep all follow-up visits as told by your health care provider. This is important.  Contact a health care provider if:  · You are having trouble eating.  · You have new or worsening symptoms.  Get help right away if:  · Your jaw locks open or closed.  Summary  · Temporomandibular joint syndrome (TMJ syndrome) is a condition that causes pain in the temporomandibular joints. These joints are located near your ears and allow your jaw to open and close.  · TMJ syndrome is often mild and goes away within a few weeks. However, sometimes the condition becomes a long-term (chronic) problem.  · Symptoms include an aching pain on the side of the head in the area of the TMJ, pain when chewing or biting, and being unable to open your jaw all the way. You may also make a clicking sound when you open your mouth.  · TMJ syndrome often goes away on its own. If treatment is needed, it may include medicines to relieve pain, reduce inflammation, or relax the muscles. A splint, bite plate, or  mouthpiece may also be used to prevent teeth grinding or jaw clenching.  This information is not intended to replace advice given to you by your health care provider. Make sure you discuss any questions you have with your health care provider.  Document Released: 09/12/2002 Document Revised: 03/01/2019 Document Reviewed: 01/29/2019  [employer] Patient Education © 2020 [employer]  Shoulder Exercises  Ask your health care provider which exercises are safe for you. Do exercises exactly as told by your health care provider and adjust them as directed. It is normal to feel mild stretching, pulling, tightness, or discomfort as you do these exercises. Stop right away if you feel sudden pain or your pain gets worse. Do not begin these exercises until told by your health care provider.  Stretching exercises  External rotation and abduction  This exercise is sometimes called corner stretch. This exercise rotates your arm outward (external rotation) and moves your arm out from your body (abduction).  1.  a doorway with one of your feet slightly in front of the other. This is called a staggered stance. If you cannot reach your forearms to the door frame, stand facing a corner of a room.  2. Choose one of the following positions as told by your health care provider:  ? Place your hands and forearms on the door frame above your head.  ? Place your hands and forearms on the door frame at the height of your head.  ? Place your hands on the door frame at the height of your elbows.  3. Slowly move your weight onto your front foot until you feel a stretch across your chest and in the front of your shoulders. Keep your head and chest upright and keep your abdominal muscles tight.  4. Hold for __________ seconds.  5. To release the stretch, shift your weight to your back foot.  Repeat __________ times. Complete this exercise __________ times a day.  Extension, standing  1. Stand and hold a broomstick, a cane, or a similar  object behind your back.  ? Your hands should be a little wider than shoulder width apart.  ? Your palms should face away from your back.  2. Keeping your elbows straight and your shoulder muscles relaxed, move the stick away from your body until you feel a stretch in your shoulders (extension).  ? Avoid shrugging your shoulders while you move the stick. Keep your shoulder blades tucked down toward the middle of your back.  3. Hold for __________ seconds.  4. Slowly return to the starting position.  Repeat __________ times. Complete this exercise __________ times a day.  Range-of-motion exercises  Pendulum    1. Stand near a wall or a surface that you can hold onto for balance.  2. Bend at the waist and let your left / right arm hang straight down. Use your other arm to support you. Keep your back straight and do not lock your knees.  3. Relax your left / right arm and shoulder muscles, and move your hips and your trunk so your left / right arm swings freely. Your arm should swing because of the motion of your body, not because you are using your arm or shoulder muscles.  4. Keep moving your hips and trunk so your arm swings in the following directions, as told by your health care provider:  ? Side to side.  ? Forward and backward.  ? In clockwise and counterclockwise circles.  5. Continue each motion for __________ seconds, or for as long as told by your health care provider.  6. Slowly return to the starting position.  Repeat __________ times. Complete this exercise __________ times a day.  Shoulder flexion, standing    1. Stand and hold a broomstick, a cane, or a similar object. Place your hands a little more than shoulder width apart on the object. Your left / right hand should be palm up, and your other hand should be palm down.  2. Keep your elbow straight and your shoulder muscles relaxed. Push the stick up with your healthy arm to raise your left / right arm in front of your body, and then over your head  until you feel a stretch in your shoulder (flexion).  ? Avoid shrugging your shoulder while you raise your arm. Keep your shoulder blade tucked down toward the middle of your back.  3. Hold for __________ seconds.  4. Slowly return to the starting position.  Repeat __________ times. Complete this exercise __________ times a day.  Shoulder abduction, standing  1. Stand and hold a broomstick, a cane, or a similar object. Place your hands a little more than shoulder width apart on the object. Your left / right hand should be palm up, and your other hand should be palm down.  2. Keep your elbow straight and your shoulder muscles relaxed. Push the object across your body toward your left / right side. Raise your left / right arm to the side of your body (abduction) until you feel a stretch in your shoulder.  ? Do not raise your arm above shoulder height unless your health care provider tells you to do that.  ? If directed, raise your arm over your head.  ? Avoid shrugging your shoulder while you raise your arm. Keep your shoulder blade tucked down toward the middle of your back.  3. Hold for __________ seconds.  4. Slowly return to the starting position.  Repeat __________ times. Complete this exercise __________ times a day.  Internal rotation    1. Place your left / right hand behind your back, palm up.  2. Use your other hand to dangle an exercise band, a towel, or a similar object over your shoulder. Grasp the band with your left / right hand so you are holding on to both ends.  3. Gently pull up on the band until you feel a stretch in the front of your left / right shoulder. The movement of your arm toward the center of your body is called internal rotation.  ? Avoid shrugging your shoulder while you raise your arm. Keep your shoulder blade tucked down toward the middle of your back.  4. Hold for __________ seconds.  5. Release the stretch by letting go of the band and lowering your hands.  Repeat __________ times.  Complete this exercise __________ times a day.  Strengthening exercises  External rotation    1. Sit in a stable chair without armrests.  2. Secure an exercise band to a stable object at elbow height on your left / right side.  3. Place a soft object, such as a folded towel or a small pillow, between your left / right upper arm and your body to move your elbow about 4 inches (10 cm) away from your side.  4. Hold the end of the exercise band so it is tight and there is no slack.  5. Keeping your elbow pressed against the soft object, slowly move your forearm out, away from your abdomen (external rotation). Keep your body steady so only your forearm moves.  6. Hold for __________ seconds.  7. Slowly return to the starting position.  Repeat __________ times. Complete this exercise __________ times a day.  Shoulder abduction    1. Sit in a stable chair without armrests, or stand up.  2. Hold a __________ weight in your left / right hand, or hold an exercise band with both hands.  3. Start with your arms straight down and your left / right palm facing in, toward your body.  4. Slowly lift your left / right hand out to your side (abduction). Do not lift your hand above shoulder height unless your health care provider tells you that this is safe.  ? Keep your arms straight.  ? Avoid shrugging your shoulder while you do this movement. Keep your shoulder blade tucked down toward the middle of your back.  5. Hold for __________ seconds.  6. Slowly lower your arm, and return to the starting position.  Repeat __________ times. Complete this exercise __________ times a day.  Shoulder extension  1. Sit in a stable chair without armrests, or stand up.  2. Secure an exercise band to a stable object in front of you so it is at shoulder height.  3. Hold one end of the exercise band in each hand. Your palms should face each other.  4. Straighten your elbows and lift your hands up to shoulder height.  5. Step back, away from the  secured end of the exercise band, until the band is tight and there is no slack.  6. Squeeze your shoulder blades together as you pull your hands down to the sides of your thighs (extension). Stop when your hands are straight down by your sides. Do not let your hands go behind your body.  7. Hold for __________ seconds.  8. Slowly return to the starting position.  Repeat __________ times. Complete this exercise __________ times a day.  Shoulder row  1. Sit in a stable chair without armrests, or stand up.  2. Secure an exercise band to a stable object in front of you so it is at waist height.  3. Hold one end of the exercise band in each hand. Position your palms so that your thumbs are facing the ceiling (neutral position).  4. Bend each of your elbows to a 90-degree angle (right angle) and keep your upper arms at your sides.  5. Step back until the band is tight and there is no slack.  6. Slowly pull your elbows back behind you.  7. Hold for __________ seconds.  8. Slowly return to the starting position.  Repeat __________ times. Complete this exercise __________ times a day.  Shoulder press-ups    1. Sit in a stable chair that has armrests. Sit upright, with your feet flat on the floor.  2. Put your hands on the armrests so your elbows are bent and your fingers are pointing forward. Your hands should be about even with the sides of your body.  3. Push down on the armrests and use your arms to lift yourself off the chair. Straighten your elbows and lift yourself up as much as you comfortably can.  ? Move your shoulder blades down, and avoid letting your shoulders move up toward your ears.  ? Keep your feet on the ground. As you get stronger, your feet should support less of your body weight as you lift yourself up.  4. Hold for __________ seconds.  5. Slowly lower yourself back into the chair.  Repeat __________ times. Complete this exercise __________ times a day.  Wall push-ups    1. Stand so you are facing a  stable wall. Your feet should be about one arm-length away from the wall.  2. Lean forward and place your palms on the wall at shoulder height.  3. Keep your feet flat on the floor as you bend your elbows and lean forward toward the wall.  4. Hold for __________ seconds.  5. Straighten your elbows to push yourself back to the starting position.  Repeat __________ times. Complete this exercise __________ times a day.  This information is not intended to replace advice given to you by your health care provider. Make sure you discuss any questions you have with your health care provider.  Document Released: 11/01/2006 Document Revised: 04/10/2020 Document Reviewed: 01/17/2020  [employer] Patient Education © 2020 [employer]

## 2021-03-10 ENCOUNTER — OFFICE VISIT (OUTPATIENT)
Dept: FAMILY MEDICINE CLINIC | Facility: CLINIC | Age: 51
End: 2021-03-10

## 2021-03-10 VITALS
SYSTOLIC BLOOD PRESSURE: 118 MMHG | HEIGHT: 64 IN | BODY MASS INDEX: 29.26 KG/M2 | RESPIRATION RATE: 18 BRPM | TEMPERATURE: 97.7 F | WEIGHT: 171.4 LBS | HEART RATE: 77 BPM | DIASTOLIC BLOOD PRESSURE: 76 MMHG | OXYGEN SATURATION: 96 %

## 2021-03-10 DIAGNOSIS — E06.3 HYPOTHYROIDISM DUE TO HASHIMOTO'S THYROIDITIS: Primary | Chronic | ICD-10-CM

## 2021-03-10 DIAGNOSIS — E03.8 HYPOTHYROIDISM DUE TO HASHIMOTO'S THYROIDITIS: Primary | Chronic | ICD-10-CM

## 2021-03-10 PROBLEM — F41.9 ANXIETY: Status: ACTIVE | Noted: 2021-03-10

## 2021-03-10 PROCEDURE — 99213 OFFICE O/P EST LOW 20 MIN: CPT | Performed by: FAMILY MEDICINE

## 2021-03-10 RX ORDER — OLANZAPINE 2.5 MG/1
TABLET ORAL
COMMUNITY
Start: 2021-03-04 | End: 2023-03-07 | Stop reason: SDDI

## 2021-03-10 NOTE — PROGRESS NOTES
"Chief Complaint  Hypothyroidism    Subjective          Argelia Forte presents to [facility]  Patient is here today to follow-up on hypothyroidism.  Currently she is not taking her medication.  She has had intermittent bouts of noncompliance in the past.  She ran out of levothyroxine about 6 months ago.  She has not been experiencing any worsening fatigue, swelling or numbness.  She has seen some recent some weight gain.      Objective   Vital Signs:   /76 (BP Location: Left arm, Patient Position: Sitting, Cuff Size: Adult)   Pulse 77   Temp 97.7 °F (36.5 °C) (Temporal)   Resp 18   Ht 162.6 cm (64.02\")   Wt 77.7 kg (171 lb 6.4 oz)   SpO2 96%   BMI 29.41 kg/m²     Physical Exam  Vitals and nursing note reviewed.   Constitutional:       Appearance: Normal appearance. She is well-developed.   HENT:      Head: Normocephalic and atraumatic.   Eyes:      General: No scleral icterus.     Conjunctiva/sclera: Conjunctivae normal.   Cardiovascular:      Rate and Rhythm: Normal rate and regular rhythm.      Heart sounds: Normal heart sounds.   Pulmonary:      Effort: Pulmonary effort is normal.      Breath sounds: Normal breath sounds.   Musculoskeletal:         General: Normal range of motion.      Cervical back: Normal range of motion and neck supple.   Skin:     General: Skin is warm and dry.      Capillary Refill: Capillary refill takes less than 2 seconds.      Findings: No rash.   Neurological:      Mental Status: She is alert and oriented to person, place, and time.   Psychiatric:         Mood and Affect: Mood normal.         Behavior: Behavior normal.         Thought Content: Thought content normal.         Judgment: Judgment normal.        Result Review :   The following data was reviewed by: Radha Merrill DO on 03/10/2021:                    Assessment and Plan    Diagnoses and all orders for this visit:    1. Hypothyroidism due to Hashimoto's thyroiditis (Primary)  -     " TSH    Patient here today to follow-up on hypothyroidism. Surveillance labs were obtained and medication changes will be made based on lab results and will be called to the patient later this week.      Follow Up   Return in about 2 months (around 5/10/2021) for thyroid.  Patient was given instructions and counseling regarding her condition or for health maintenance advice. Please see specific information pulled into the AVS if appropriate.

## 2021-03-11 DIAGNOSIS — E03.8 HYPOTHYROIDISM DUE TO HASHIMOTO'S THYROIDITIS: Primary | ICD-10-CM

## 2021-03-11 DIAGNOSIS — E06.3 HYPOTHYROIDISM DUE TO HASHIMOTO'S THYROIDITIS: Primary | ICD-10-CM

## 2021-03-11 LAB — TSH SERPL DL<=0.005 MIU/L-ACNC: 13.4 UIU/ML (ref 0.45–4.5)

## 2021-03-11 RX ORDER — LEVOTHYROXINE SODIUM 0.05 MG/1
50 TABLET ORAL DAILY
Qty: 90 TABLET | Refills: 0 | Status: SHIPPED | OUTPATIENT
Start: 2021-03-11 | End: 2021-05-11 | Stop reason: SDUPTHER

## 2021-05-10 ENCOUNTER — OFFICE VISIT (OUTPATIENT)
Dept: FAMILY MEDICINE CLINIC | Facility: CLINIC | Age: 51
End: 2021-05-10

## 2021-05-10 VITALS
SYSTOLIC BLOOD PRESSURE: 122 MMHG | WEIGHT: 169.6 LBS | HEIGHT: 64 IN | BODY MASS INDEX: 28.95 KG/M2 | HEART RATE: 76 BPM | OXYGEN SATURATION: 99 % | TEMPERATURE: 97.7 F | DIASTOLIC BLOOD PRESSURE: 64 MMHG

## 2021-05-10 DIAGNOSIS — E03.8 HYPOTHYROIDISM DUE TO HASHIMOTO'S THYROIDITIS: Primary | ICD-10-CM

## 2021-05-10 DIAGNOSIS — E06.3 HYPOTHYROIDISM DUE TO HASHIMOTO'S THYROIDITIS: Primary | ICD-10-CM

## 2021-05-10 PROCEDURE — 99214 OFFICE O/P EST MOD 30 MIN: CPT | Performed by: FAMILY MEDICINE

## 2021-05-10 NOTE — PROGRESS NOTES
"Chief Complaint  Hypothyroidism    Subjective          Argelia Forte presents to [facility]  Patient is here today to follow-up on hypothyroidism.  Patient has a history of hypothyroidism and stopped her medication on her own prior to her last visit.  She started back on levothyroxine 50 mcg daily 2 months ago.  Patient admits that she has not been consistently taking the medication daily but at least takes it 3-4 times a week.  She feels the dose is accurate      Objective   Vital Signs:   /64   Pulse 76   Temp 97.7 °F (36.5 °C)   Ht 162.6 cm (64.02\")   Wt 76.9 kg (169 lb 9.6 oz)   SpO2 99%   BMI 29.10 kg/m²     Physical Exam  Vitals and nursing note reviewed.   Constitutional:       Appearance: Normal appearance. She is well-developed.   HENT:      Head: Normocephalic and atraumatic.   Eyes:      Conjunctiva/sclera: Conjunctivae normal.   Cardiovascular:      Rate and Rhythm: Normal rate and regular rhythm.      Heart sounds: Normal heart sounds.   Pulmonary:      Effort: Pulmonary effort is normal.      Breath sounds: Normal breath sounds.   Musculoskeletal:         General: Normal range of motion.      Cervical back: Normal range of motion and neck supple.   Skin:     General: Skin is warm and dry.      Capillary Refill: Capillary refill takes less than 2 seconds.      Findings: No rash.   Neurological:      Mental Status: She is alert and oriented to person, place, and time.   Psychiatric:         Mood and Affect: Mood normal.         Behavior: Behavior normal.         Thought Content: Thought content normal.         Judgment: Judgment normal.        Result Review :   The following data was reviewed by: Radha Merrill DO on 05/10/2021:      TSH    TSH 3/10/21   TSH 13.400 (A)   (A) Abnormal value                      Assessment and Plan    Diagnoses and all orders for this visit:    1. Hypothyroidism due to Hashimoto's thyroiditis (Primary)  -     TSH    Patient is " here today for uncontrolled hypothyroidism.  Surveillance labs were obtained today and any medication changes will be made based on lab results and will be called to the patient later this week.      Follow Up   Return in about 3 months (around 8/10/2021) for Thyroid.  Patient was given instructions and counseling regarding her condition or for health maintenance advice. Please see specific information pulled into the AVS if appropriate.

## 2021-05-11 DIAGNOSIS — E03.8 HYPOTHYROIDISM DUE TO HASHIMOTO'S THYROIDITIS: ICD-10-CM

## 2021-05-11 DIAGNOSIS — E06.3 HYPOTHYROIDISM DUE TO HASHIMOTO'S THYROIDITIS: ICD-10-CM

## 2021-05-11 LAB — TSH SERPL DL<=0.005 MIU/L-ACNC: 8.5 UIU/ML (ref 0.27–4.2)

## 2021-05-11 RX ORDER — LEVOTHYROXINE SODIUM 0.07 MG/1
75 TABLET ORAL DAILY
Qty: 90 TABLET | Refills: 1 | Status: SHIPPED | OUTPATIENT
Start: 2021-05-11 | End: 2022-03-18 | Stop reason: SDUPTHER

## 2021-11-24 RX ORDER — LEVOTHYROXINE SODIUM 0.05 MG/1
TABLET ORAL
Qty: 30 TABLET | Refills: 0 | Status: SHIPPED | OUTPATIENT
Start: 2021-11-24 | End: 2022-03-16 | Stop reason: DRUGHIGH

## 2022-03-16 ENCOUNTER — OFFICE VISIT (OUTPATIENT)
Dept: FAMILY MEDICINE CLINIC | Facility: CLINIC | Age: 52
End: 2022-03-16

## 2022-03-16 VITALS
BODY MASS INDEX: 28.27 KG/M2 | HEART RATE: 70 BPM | TEMPERATURE: 97.1 F | HEIGHT: 64 IN | OXYGEN SATURATION: 99 % | DIASTOLIC BLOOD PRESSURE: 70 MMHG | WEIGHT: 165.6 LBS | SYSTOLIC BLOOD PRESSURE: 100 MMHG

## 2022-03-16 DIAGNOSIS — E06.3 HYPOTHYROIDISM DUE TO HASHIMOTO'S THYROIDITIS: Primary | ICD-10-CM

## 2022-03-16 DIAGNOSIS — H60.501 ACUTE OTITIS EXTERNA OF RIGHT EAR, UNSPECIFIED TYPE: ICD-10-CM

## 2022-03-16 DIAGNOSIS — E03.8 HYPOTHYROIDISM DUE TO HASHIMOTO'S THYROIDITIS: Primary | ICD-10-CM

## 2022-03-16 PROCEDURE — 99214 OFFICE O/P EST MOD 30 MIN: CPT | Performed by: FAMILY MEDICINE

## 2022-03-16 RX ORDER — NEOMYCIN SULFATE, POLYMYXIN B SULFATE AND HYDROCORTISONE 10; 3.5; 1 MG/ML; MG/ML; [USP'U]/ML
3 SUSPENSION/ DROPS AURICULAR (OTIC) 4 TIMES DAILY
Qty: 10 ML | Refills: 0 | Status: SHIPPED | OUTPATIENT
Start: 2022-03-16 | End: 2022-05-16

## 2022-03-16 NOTE — PROGRESS NOTES
"Chief Complaint  Hypothyroidism and Earache    Subjective          Argelia Forte presents to [facility]  Patient is here today to follow-up on hypothyroidism.  Patient has a history of noncompliance with medication.  It has been 10 months since her last visit.  Patient has not taken levothyroxine for couple of months.  She ran out.  Her last dosage was 75 mcg daily.    Patient is also here for an acute new problem.  She states her right ear has been aching the last few days intermittently.  She denies any hearing problems or discharge from the ear.  She states that it is tender to touch but that it feels better today than yesterday.      Objective   Vital Signs:   /70   Pulse 70   Temp 97.1 °F (36.2 °C)   Ht 162.6 cm (64\")   Wt 75.1 kg (165 lb 9.6 oz)   SpO2 99%   BMI 28.43 kg/m²     Physical Exam  Vitals and nursing note reviewed.   Constitutional:       Appearance: Normal appearance. She is well-developed.   HENT:      Head: Normocephalic and atraumatic.      Right Ear: Hearing, tympanic membrane and external ear normal.      Left Ear: Hearing, tympanic membrane, ear canal and external ear normal.      Ears:      Comments: Slightly erythematous right EAC.     Nose: Nose normal.   Eyes:      General: No scleral icterus.     Conjunctiva/sclera: Conjunctivae normal.   Cardiovascular:      Rate and Rhythm: Normal rate and regular rhythm.      Heart sounds: Normal heart sounds.   Pulmonary:      Effort: Pulmonary effort is normal.      Breath sounds: Normal breath sounds.   Musculoskeletal:      Cervical back: Normal range of motion and neck supple.   Lymphadenopathy:      Cervical: No cervical adenopathy.   Skin:     General: Skin is warm and dry.      Findings: No rash.   Neurological:      Mental Status: She is alert and oriented to person, place, and time.   Psychiatric:         Mood and Affect: Mood normal.         Behavior: Behavior normal.         Thought Content: " Thought content normal.         Judgment: Judgment normal.        Result Review :   The following data was reviewed by: Radha Merrill DO on 03/16/2022:      TSH    TSH 5/10/21   TSH 8.500 (A)   (A) Abnormal value                      Assessment and Plan    Diagnoses and all orders for this visit:    1. Hypothyroidism due to Hashimoto's thyroiditis (Primary)  -     TSH    2. Acute otitis externa of right ear, unspecified type  -     neomycin-polymyxin-hydrocortisone (CORTISPORIN) 3.5-82744-8 otic suspension; Administer 3 drops to the right ear 4 (Four) Times a Day.  Dispense: 10 mL; Refill: 0    Patient is here today to follow-up on uncontrolled hypothyroidism.  Labs will be performed today and levothyroxine will be restarted based on lab results.    Patient is also here for new problem of right otitis externa.  Rx was sent to preferred pharmacy.  If symptoms or not improving follow-up.      Follow Up   Return in about 2 months (around 5/16/2022).  Patient was given instructions and counseling regarding her condition or for health maintenance advice. Please see specific information pulled into the AVS if appropriate.

## 2022-03-17 LAB — TSH SERPL DL<=0.005 MIU/L-ACNC: 9.9 UIU/ML (ref 0.45–4.5)

## 2022-03-18 DIAGNOSIS — E03.8 HYPOTHYROIDISM DUE TO HASHIMOTO'S THYROIDITIS: ICD-10-CM

## 2022-03-18 DIAGNOSIS — E06.3 HYPOTHYROIDISM DUE TO HASHIMOTO'S THYROIDITIS: ICD-10-CM

## 2022-03-18 RX ORDER — LEVOTHYROXINE SODIUM 0.05 MG/1
50 TABLET ORAL DAILY
Qty: 30 TABLET | Refills: 1 | Status: SHIPPED | OUTPATIENT
Start: 2022-03-18 | End: 2022-06-08

## 2022-05-16 ENCOUNTER — OFFICE VISIT (OUTPATIENT)
Dept: FAMILY MEDICINE CLINIC | Facility: CLINIC | Age: 52
End: 2022-05-16

## 2022-05-16 VITALS
OXYGEN SATURATION: 98 % | SYSTOLIC BLOOD PRESSURE: 112 MMHG | DIASTOLIC BLOOD PRESSURE: 66 MMHG | TEMPERATURE: 98.6 F | WEIGHT: 166.6 LBS | HEART RATE: 78 BPM | HEIGHT: 64 IN | BODY MASS INDEX: 28.44 KG/M2

## 2022-05-16 DIAGNOSIS — E03.8 HYPOTHYROIDISM DUE TO HASHIMOTO'S THYROIDITIS: Primary | ICD-10-CM

## 2022-05-16 DIAGNOSIS — E06.3 HYPOTHYROIDISM DUE TO HASHIMOTO'S THYROIDITIS: Primary | ICD-10-CM

## 2022-05-16 PROCEDURE — 99213 OFFICE O/P EST LOW 20 MIN: CPT | Performed by: FAMILY MEDICINE

## 2022-05-16 NOTE — PROGRESS NOTES
"Chief Complaint  Hypothyroidism    Subjective          Argelia Forte presents to [facility]  Patient is here today to follow-up on hypothyroidism.  Patient has a history of noncompliance with medication.  Patient was restarted on levothyroxine 50 mcg daily 2 months ago.  Last dose was 3 days ago.      Objective   Vital Signs:  /66   Pulse 78   Temp 98.6 °F (37 °C)   Ht 162.6 cm (64\")   Wt 75.6 kg (166 lb 9.6 oz)   SpO2 98%   BMI 28.60 kg/m²           Physical Exam  Vitals and nursing note reviewed.   Constitutional:       Appearance: Normal appearance. She is well-developed.   HENT:      Head: Normocephalic and atraumatic.   Eyes:      General: No scleral icterus.     Conjunctiva/sclera: Conjunctivae normal.   Cardiovascular:      Rate and Rhythm: Normal rate and regular rhythm.      Heart sounds: Normal heart sounds.   Pulmonary:      Effort: Pulmonary effort is normal.      Breath sounds: Normal breath sounds.   Musculoskeletal:         General: Normal range of motion.      Cervical back: Normal range of motion and neck supple.      Right lower leg: No edema.      Left lower leg: No edema.   Skin:     General: Skin is warm and dry.      Capillary Refill: Capillary refill takes less than 2 seconds.      Findings: No rash.   Neurological:      Mental Status: She is alert and oriented to person, place, and time.   Psychiatric:         Mood and Affect: Mood normal.         Behavior: Behavior normal.         Thought Content: Thought content normal.         Judgment: Judgment normal.        Result Review :   The following data was reviewed by: Radha Merrill DO on 05/16/2022:      TSH    TSH 3/16/22   TSH 9.900 (A)   (A) Abnormal value                      Assessment and Plan    Diagnoses and all orders for this visit:    1. Hypothyroidism due to Hashimoto's thyroiditis (Primary)  -     TSH      Patient is here to follow-up on chronic stable hypothyroidism.  Surveillance labs " were obtained today and any medication changes will be made based on lab results and will be called to the patient later this week.       Follow Up   Return in about 3 months (around 8/16/2022) for Thyroid.  Patient was given instructions and counseling regarding her condition or for health maintenance advice. Please see specific information pulled into the AVS if appropriate.

## 2022-05-17 LAB — TSH SERPL DL<=0.005 MIU/L-ACNC: 7.95 UIU/ML (ref 0.45–4.5)

## 2022-06-08 DIAGNOSIS — E06.3 HYPOTHYROIDISM DUE TO HASHIMOTO'S THYROIDITIS: ICD-10-CM

## 2022-06-08 DIAGNOSIS — E03.8 HYPOTHYROIDISM DUE TO HASHIMOTO'S THYROIDITIS: ICD-10-CM

## 2022-06-08 RX ORDER — LEVOTHYROXINE SODIUM 0.05 MG/1
50 TABLET ORAL DAILY
Qty: 90 TABLET | Refills: 0 | Status: SHIPPED | OUTPATIENT
Start: 2022-06-08 | End: 2022-12-05 | Stop reason: SDUPTHER

## 2022-12-05 ENCOUNTER — OFFICE VISIT (OUTPATIENT)
Dept: FAMILY MEDICINE CLINIC | Facility: CLINIC | Age: 52
End: 2022-12-05

## 2022-12-05 VITALS
TEMPERATURE: 97.7 F | WEIGHT: 162.8 LBS | BODY MASS INDEX: 27.79 KG/M2 | SYSTOLIC BLOOD PRESSURE: 102 MMHG | DIASTOLIC BLOOD PRESSURE: 64 MMHG | HEART RATE: 75 BPM | HEIGHT: 64 IN | OXYGEN SATURATION: 99 %

## 2022-12-05 DIAGNOSIS — E06.3 HYPOTHYROIDISM DUE TO HASHIMOTO'S THYROIDITIS: Primary | ICD-10-CM

## 2022-12-05 DIAGNOSIS — E03.8 HYPOTHYROIDISM DUE TO HASHIMOTO'S THYROIDITIS: Primary | ICD-10-CM

## 2022-12-05 PROCEDURE — 99213 OFFICE O/P EST LOW 20 MIN: CPT | Performed by: FAMILY MEDICINE

## 2022-12-05 RX ORDER — LEVOTHYROXINE SODIUM 0.05 MG/1
50 TABLET ORAL DAILY
Qty: 90 TABLET | Refills: 1 | Status: SHIPPED | OUTPATIENT
Start: 2022-12-05 | End: 2023-03-07 | Stop reason: SDUPTHER

## 2022-12-05 NOTE — PROGRESS NOTES
"Answers for HPI/ROS submitted by the patient on 12/4/2022  Please describe your symptoms.: Thyroid  Have you had these symptoms before?: Yes  How long have you been having these symptoms?: Greater than 2 weeks  Please list any medications you are currently taking for this condition.: Thyroid medicine  What is the primary reason for your visit?: Other    Chief Complaint  Med Refill and Hypothyroidism    Subjective        Argelia Forte presents to [facility]  History of Present Illness  The patient is a patient of Dr. Radha Merrill, and this is my first time seeing her today.    Hashimoto's disease  The patient states she need her thyroid checked and she is out of her thyroid medication. She states she was diagnosed with thyroid disease in her 20's. She states she was unaware of the Hashimoto's diagnosis until she saw a specialist in [place].  She was at one point overtreated and notes made her hesitant to treat her thyroid appropriately.  They thought she had Graves' disease at 1 point when she was getting too much thyroid medicine and it triggered her anxiety.    Objective   Vital Signs:  /64   Pulse 75   Temp 97.7 °F (36.5 °C)   Ht 162.6 cm (64\")   Wt 73.8 kg (162 lb 12.8 oz)   SpO2 99%   BMI 27.94 kg/m²   Estimated body mass index is 27.94 kg/m² as calculated from the following:    Height as of this encounter: 162.6 cm (64\").    Weight as of this encounter: 73.8 kg (162 lb 12.8 oz).          Physical Exam  Vitals reviewed: No swelling today.   Constitutional:       General: She is not in acute distress.     Appearance: Normal appearance. She is well-developed.   HENT:      Head: Normocephalic and atraumatic.      Right Ear: Tympanic membrane, ear canal and external ear normal.      Left Ear: Tympanic membrane, ear canal and external ear normal.      Mouth/Throat:      Mouth: Mucous membranes are moist.      Pharynx: Oropharynx is clear.   Eyes:      Conjunctiva/sclera: " Conjunctivae normal.      Pupils: Pupils are equal, round, and reactive to light.   Neck:      Thyroid: No thyromegaly.   Cardiovascular:      Rate and Rhythm: Normal rate and regular rhythm.      Heart sounds: No murmur heard.  Pulmonary:      Effort: Pulmonary effort is normal.      Breath sounds: Normal breath sounds. No wheezing.   Abdominal:      General: Bowel sounds are normal.      Palpations: Abdomen is soft.      Tenderness: There is no abdominal tenderness.   Musculoskeletal:         General: Normal range of motion.      Cervical back: Neck supple.   Lymphadenopathy:      Cervical: No cervical adenopathy.   Skin:     General: Skin is warm and dry.   Neurological:      Mental Status: She is alert and oriented to person, place, and time.   Psychiatric:         Mood and Affect: Mood normal.         Behavior: Behavior normal.        Result Review :                Assessment and Plan   Diagnoses and all orders for this visit:    1. Hypothyroidism due to Hashimoto's thyroiditis (Primary)  -     TSH  -     levothyroxine (SYNTHROID, LEVOTHROID) 50 MCG tablet; Take 1 tablet by mouth Daily.  Dispense: 90 tablet; Refill: 1      1. Hypothyroidism.  -We will check a TSH today.   -We will restart the patient on levothyroxine 50 mcg.   -She will follow up in 3 months.  Make sure to be taking medication at follow-up.       Follow Up   Return in about 3 months (around 3/5/2023) for Recheck thyroid with Dr. Merrill.  Patient was given instructions and counseling regarding her condition or for health maintenance advice. Please see specific information pulled into the AVS if appropriate.     Transcribed from ambient dictation for Meredith Lea Kehrer, MD by Nicole Ruelas.  12/05/22   11:31 EST    Patient or patient representative verbalized consent to the visit recording.

## 2022-12-06 LAB — TSH SERPL DL<=0.005 MIU/L-ACNC: 11.2 UIU/ML (ref 0.27–4.2)

## 2023-03-07 ENCOUNTER — OFFICE VISIT (OUTPATIENT)
Dept: FAMILY MEDICINE CLINIC | Facility: CLINIC | Age: 53
End: 2023-03-07
Payer: COMMERCIAL

## 2023-03-07 VITALS
TEMPERATURE: 97.8 F | OXYGEN SATURATION: 96 % | RESPIRATION RATE: 16 BRPM | HEIGHT: 64 IN | HEART RATE: 80 BPM | DIASTOLIC BLOOD PRESSURE: 80 MMHG | SYSTOLIC BLOOD PRESSURE: 116 MMHG | BODY MASS INDEX: 26.63 KG/M2 | WEIGHT: 156 LBS

## 2023-03-07 DIAGNOSIS — E06.3 HYPOTHYROIDISM DUE TO HASHIMOTO'S THYROIDITIS: ICD-10-CM

## 2023-03-07 DIAGNOSIS — F32.A ANXIETY AND DEPRESSION: ICD-10-CM

## 2023-03-07 DIAGNOSIS — F41.9 ANXIETY AND DEPRESSION: ICD-10-CM

## 2023-03-07 DIAGNOSIS — E06.3 HYPOTHYROIDISM DUE TO HASHIMOTO'S THYROIDITIS: Primary | ICD-10-CM

## 2023-03-07 DIAGNOSIS — E03.8 HYPOTHYROIDISM DUE TO HASHIMOTO'S THYROIDITIS: ICD-10-CM

## 2023-03-07 DIAGNOSIS — E03.8 HYPOTHYROIDISM DUE TO HASHIMOTO'S THYROIDITIS: Primary | ICD-10-CM

## 2023-03-07 LAB — TSH SERPL DL<=0.005 MIU/L-ACNC: 5.93 UIU/ML (ref 0.27–4.2)

## 2023-03-07 PROCEDURE — 99214 OFFICE O/P EST MOD 30 MIN: CPT | Performed by: FAMILY MEDICINE

## 2023-03-07 RX ORDER — LEVOTHYROXINE SODIUM 0.05 MG/1
50 TABLET ORAL DAILY
Qty: 90 TABLET | Refills: 1 | Status: SHIPPED | OUTPATIENT
Start: 2023-03-07

## 2023-03-07 NOTE — PROGRESS NOTES
"Answers for HPI/ROS submitted by the patient on 3/7/2023  Please describe your symptoms.: Having thyroid checked  Have you had these symptoms before?: Yes  How long have you been having these symptoms?: Greater than 2 weeks  Please list any medications you are currently taking for this condition.: Thyroid med  What is the primary reason for your visit?: Other    Chief Complaint  Hypothyroidism    Subjective        Argelia Forte presents to [facility]  History of Present Illness  Patient is here today to follow-up on her chronic health conditions:    Hypothyroidism.  Patient currently is taking levothyroxine 50 mcg daily.  Unfortunately the patient has a history of noncompliance with her medication and follow-up.  Patient has missed about 2 doses weekly on average of the levothyroxine since her last appointment in December 2022.    Depression and anxiety.  Currently the patient is not taking sertraline or hydroxyzine or Zyprexa.  She states that those medications never seem to help.  She would rather not take any new medication at this time.  She feels that her symptoms are well controlled.  She denies SI or HI          Objective   Vital Signs:  /80 (BP Location: Left arm, Patient Position: Sitting, Cuff Size: Adult)   Pulse 80   Temp 97.8 °F (36.6 °C) (Temporal)   Resp 16   Ht 162.6 cm (64.02\")   Wt 70.8 kg (156 lb)   SpO2 96%   BMI 26.76 kg/m²   Estimated body mass index is 26.76 kg/m² as calculated from the following:    Height as of this encounter: 162.6 cm (64.02\").    Weight as of this encounter: 70.8 kg (156 lb).             Physical Exam  Vitals and nursing note reviewed.   Constitutional:       Appearance: Normal appearance. She is well-developed.   HENT:      Head: Normocephalic and atraumatic.      Right Ear: External ear normal.      Left Ear: External ear normal.      Nose: Nose normal.   Eyes:      General: No scleral icterus.     Conjunctiva/sclera: " Conjunctivae normal.   Cardiovascular:      Rate and Rhythm: Normal rate and regular rhythm.      Heart sounds: Normal heart sounds.   Pulmonary:      Effort: Pulmonary effort is normal.      Breath sounds: Normal breath sounds.   Musculoskeletal:      Cervical back: Normal range of motion and neck supple.      Right lower leg: No edema.      Left lower leg: No edema.   Lymphadenopathy:      Cervical: No cervical adenopathy.   Skin:     General: Skin is warm and dry.      Findings: No rash.   Neurological:      Mental Status: She is alert and oriented to person, place, and time.   Psychiatric:         Mood and Affect: Mood normal.         Behavior: Behavior normal.         Thought Content: Thought content normal.         Judgment: Judgment normal.        Result Review :  The following data was reviewed by: Radha Merrill DO on 03/07/2023:      TSH    TSH 3/16/22 5/16/22 12/5/22   TSH 9.900 (A) 7.950 (A) 11.200 (A)   (A) Abnormal value                     Assessment and Plan   Diagnoses and all orders for this visit:    1. Hypothyroidism due to Hashimoto's thyroiditis (Primary)  -     TSH    2. Anxiety and depression    Patient is here today for chronic stable hypothyroidism. Surveillance labs were obtained today and any medication changes will be made based on lab results and will be called to the patient later this week.    Anxiety and depression.  The patient feels her symptoms are stable and does not want treatment at this time.         Follow Up   Return in about 3 months (around 6/14/2023) for Annual physical, Thyroid.  Patient was given instructions and counseling regarding her condition or for health maintenance advice. Please see specific information pulled into the AVS if appropriate.

## 2023-07-26 ENCOUNTER — OFFICE VISIT (OUTPATIENT)
Dept: FAMILY MEDICINE CLINIC | Facility: CLINIC | Age: 53
End: 2023-07-26
Payer: COMMERCIAL

## 2023-07-26 VITALS
BODY MASS INDEX: 26.73 KG/M2 | DIASTOLIC BLOOD PRESSURE: 69 MMHG | OXYGEN SATURATION: 98 % | HEIGHT: 64 IN | WEIGHT: 156.6 LBS | HEART RATE: 78 BPM | SYSTOLIC BLOOD PRESSURE: 115 MMHG

## 2023-07-26 DIAGNOSIS — M25.512 ACUTE PAIN OF LEFT SHOULDER: Primary | ICD-10-CM

## 2023-07-26 PROCEDURE — 99213 OFFICE O/P EST LOW 20 MIN: CPT | Performed by: FAMILY MEDICINE

## 2023-07-26 NOTE — PROGRESS NOTES
"Chief Complaint  Shoulder Pain    Subjective        Argelia Forte presents to [facility]  History of Present Illness  Patient is here today to follow-up on acute pain in her shoulders.  She was seen in June and was given meloxicam and started in physical therapy.  Patient states that the pain in her right shoulder and arm has improved however her left shoulder and arm are still painful intermittently.  She states that certain movements cause the pain.  There is no pain at rest.  She is left hand dominant.  She states that she has been performing the exercises asked of her physical therapy.  She states that she had a ultrasound at physical therapy today and was told that she had some arthritis in the shoulder.  The patient states that when she takes the meloxicam it does help with the pain.  She denies any new symptoms, no numbness, tingling, or weakness in her upper extremities.    Objective   Vital Signs:  /69   Pulse 78   Ht 162.6 cm (64\")   Wt 71 kg (156 lb 9.6 oz)   SpO2 98%   BMI 26.88 kg/m²   Estimated body mass index is 26.88 kg/m² as calculated from the following:    Height as of this encounter: 162.6 cm (64\").    Weight as of this encounter: 71 kg (156 lb 9.6 oz).             Physical Exam  Vitals and nursing note reviewed.   Constitutional:       Appearance: She is well-developed.   HENT:      Head: Normocephalic and atraumatic.   Eyes:      General: No scleral icterus.     Conjunctiva/sclera: Conjunctivae normal.   Pulmonary:      Effort: Pulmonary effort is normal.   Musculoskeletal:         General: No swelling, tenderness or deformity. Normal range of motion.      Right lower leg: No edema.      Left lower leg: No edema.      Comments: Negative empty can sign.   Skin:     General: Skin is warm and dry.      Findings: No rash.   Neurological:      General: No focal deficit present.      Mental Status: She is alert and oriented to person, place, and time.      " Cranial Nerves: No cranial nerve deficit.      Sensory: No sensory deficit.      Motor: No weakness.      Coordination: Coordination normal.      Gait: Gait normal.      Deep Tendon Reflexes: Reflexes normal.   Psychiatric:         Mood and Affect: Mood normal.         Behavior: Behavior normal.         Thought Content: Thought content normal.         Judgment: Judgment normal.      Result Review :                   Assessment and Plan   Diagnoses and all orders for this visit:    1. Acute pain of left shoulder (Primary)  -     Diclofenac Sodium (VOLTAREN) 1 % gel gel; Apply 4 g topically to the appropriate area as directed 4 (Four) Times a Day As Needed (shoulder pain).  Dispense: 100 g; Refill: 1    Patient was seen today for persistent pain of her left shoulder.  I feel that the patient's injury is healing normally and discussed prognosis and expectations with the patient.  I advised the patient to continue in physical therapy and to make sure that she performs all recommended exercises.  She may use ice applications as needed and she may take meloxicam as needed.  Patient was informed to discontinue movements that cause severe pain in order not to reinjure the arm.  If pain worsens or if she develops any new symptoms she was advised to follow-up.         Follow Up   Return if symptoms worsen or fail to improve.  Patient was given instructions and counseling regarding her condition or for health maintenance advice. Please see specific information pulled into the AVS if appropriate.       Answers submitted by the patient for this visit:  Other (Submitted on 7/26/2023)  Please describe your symptoms.: Shoulder pain  Have you had these symptoms before?: No  How long have you been having these symptoms?: Greater than 2 weeks  Please list any medications you are currently taking for this condition.: Ibroprofen  Please describe any probable cause for these symptoms. : Not sure  Primary Reason for Visit (Submitted on  7/26/2023)  What is the primary reason for your visit?: Other

## 2024-07-11 ENCOUNTER — OFFICE VISIT (OUTPATIENT)
Dept: FAMILY MEDICINE CLINIC | Facility: CLINIC | Age: 54
End: 2024-07-11
Payer: COMMERCIAL

## 2024-07-11 VITALS
HEART RATE: 64 BPM | SYSTOLIC BLOOD PRESSURE: 92 MMHG | OXYGEN SATURATION: 99 % | DIASTOLIC BLOOD PRESSURE: 60 MMHG | BODY MASS INDEX: 26.12 KG/M2 | WEIGHT: 153 LBS | HEIGHT: 64 IN

## 2024-07-11 DIAGNOSIS — E06.3 HYPOTHYROIDISM DUE TO HASHIMOTO'S THYROIDITIS: Primary | ICD-10-CM

## 2024-07-11 DIAGNOSIS — E03.8 HYPOTHYROIDISM DUE TO HASHIMOTO'S THYROIDITIS: Primary | ICD-10-CM

## 2024-07-11 PROCEDURE — 99213 OFFICE O/P EST LOW 20 MIN: CPT | Performed by: NURSE PRACTITIONER

## 2024-07-11 RX ORDER — LEVOTHYROXINE SODIUM 0.05 MG/1
50 TABLET ORAL DAILY
Qty: 90 TABLET | Refills: 0 | Status: SHIPPED | OUTPATIENT
Start: 2024-07-11

## 2024-07-11 NOTE — PROGRESS NOTES
"Chief Complaint  Hypothyroidism and Back Pain (Back pain was bad when she scheduled appt its improved some)    Subjective        Argelia Forte presents to [facility]  History of Present Illness    Patient is a patient of Dr. Radha Merrill.  She presents today for follow-up on hypothyroidism.  She also has a new complaint of back pain.  She states when the appointment was scheduled she was having  bad back pain but has improved some since.    Hypothyroid: currently on levothyroxine 50mcg. Has been out of medication for a while .  Last tsh check was in march, 2023 and slightly abnormal in the 5's range        Back pain-started Monday, but not bad now.  Doesn't feel like she needs any different medications at this point.      Objective   Vital Signs:w6  BP 92/60   Pulse 64   Ht 162.6 cm (64.02\")   Wt 69.4 kg (153 lb)   SpO2 99%   BMI 26.25 kg/m²   Estimated body mass index is 26.25 kg/m² as calculated from the following:    Height as of this encounter: 162.6 cm (64.02\").    Weight as of this encounter: 69.4 kg (153 lb).             Physical Exam  Constitutional:       Appearance: Normal appearance.   Cardiovascular:      Rate and Rhythm: Normal rate and regular rhythm.      Pulses: Normal pulses.   Pulmonary:      Effort: Pulmonary effort is normal.      Breath sounds: Normal breath sounds.   Skin:     General: Skin is warm and dry.   Neurological:      Mental Status: She is alert.   Psychiatric:         Mood and Affect: Mood normal.         Behavior: Behavior normal.         Thought Content: Thought content normal.         Judgment: Judgment normal.        Result Review :                     Assessment and Plan     Diagnoses and all orders for this visit:    1. Hypothyroidism due to Hashimoto's thyroiditis (Primary)  -     TSH Rfx On Abnormal To Free T4  -     levothyroxine (SYNTHROID, LEVOTHROID) 50 MCG tablet; Take 1 tablet by mouth Daily.  Dispense: 90 tablet; Refill: 0    We will " check thyroid level.  Will go ahead and restart 50 mcg and plan a 6-week follow-up for recheck as well as physical.  She verbalized understanding and is agreeable.           Follow Up     Return in about 6 weeks (around 8/22/2024), or if symptoms worsen or fail to improve, for Annual physical, recheck thyroid.  Patient was given instructions and counseling regarding her condition or for health maintenance advice. Please see specific information pulled into the AVS if appropriate.

## 2024-07-12 LAB
T4 FREE SERPL-MCNC: 0.76 NG/DL (ref 0.93–1.7)
TSH SERPL DL<=0.005 MIU/L-ACNC: 17.1 UIU/ML (ref 0.27–4.2)

## 2024-07-12 NOTE — PROGRESS NOTES
Please let patient know that thyroid is significantly out of range.  We are restarting previous dose and I would like her to make that appointment with myself or Dr. Merrill in 6 weeks for physical and recheck.

## 2024-10-28 PROBLEM — S43.432A SLAP LESION OF LEFT SHOULDER: Status: ACTIVE | Noted: 2023-11-06

## 2024-10-28 PROBLEM — M75.02 ADHESIVE CAPSULITIS OF LEFT SHOULDER: Status: ACTIVE | Noted: 2023-09-07

## 2025-01-20 ENCOUNTER — OFFICE VISIT (OUTPATIENT)
Dept: FAMILY MEDICINE CLINIC | Facility: CLINIC | Age: 55
End: 2025-01-20
Payer: COMMERCIAL

## 2025-01-20 VITALS
WEIGHT: 156.8 LBS | SYSTOLIC BLOOD PRESSURE: 110 MMHG | HEART RATE: 78 BPM | DIASTOLIC BLOOD PRESSURE: 72 MMHG | BODY MASS INDEX: 26.77 KG/M2 | HEIGHT: 64 IN | OXYGEN SATURATION: 99 %

## 2025-01-20 DIAGNOSIS — J06.9 VIRAL UPPER RESPIRATORY TRACT INFECTION: ICD-10-CM

## 2025-01-20 DIAGNOSIS — E06.3 HYPOTHYROIDISM DUE TO HASHIMOTO'S THYROIDITIS: Primary | ICD-10-CM

## 2025-01-20 PROCEDURE — 99213 OFFICE O/P EST LOW 20 MIN: CPT | Performed by: FAMILY MEDICINE

## 2025-01-20 RX ORDER — LEVOTHYROXINE SODIUM 50 UG/1
50 TABLET ORAL DAILY
Qty: 90 TABLET | Refills: 0 | Status: SHIPPED | OUTPATIENT
Start: 2025-01-20

## 2025-01-20 NOTE — PROGRESS NOTES
"Chief Complaint  Hypothyroidism    Subjective        Argelia Forte presents to [facility]  History of Present Illness  Patient is here today to follow-up on her chronic health conditions:    Hypothyroidism.  Patient currently is not taking levothyroxine 50 mcg daily.  She has been out of it for at least 4 weeks.  Unfortunately the patient has a history of noncompliance with her medication and follow-up.     Depression and anxiety.  Currently the patient is not taking sertraline or hydroxyzine or Zyprexa.  She states that those medications never seem to help.  She would rather not take any new medication at this time.  She feels that her symptoms are well controlled.  She denies SI or HI    Patient is also here for new problem of mild right sided throat pain, right ear pressure.  Nasal congestion, and headaches for the last few days.  She denies any fevers or chills.    Mainly thyroid but have other concerns, possible URI and an earache  Symptoms are: new.   Onset was in the past 7 days.   Symptoms occur: intermittently.  Symptoms include: nasal congestion, headaches and sore throat.   Pertinent negative symptoms include no abdominal pain, no anorexia, no joint pain, no change in stool, no chest pain, no chills, no cough, no diaphoresis, no fatigue, no fever, no joint swelling, no myalgias, no nausea, no neck pain, no numbness, no rash, no swollen glands, no dysuria, no vertigo, no visual change, no vomiting and no weakness.   Treatment and/or Medications comments include: Mucinex       Objective   Vital Signs:  /72   Pulse 78   Ht 162.6 cm (64.02\")   Wt 71.1 kg (156 lb 12.8 oz)   SpO2 99%   BMI 26.90 kg/m²   Estimated body mass index is 26.9 kg/m² as calculated from the following:    Height as of this encounter: 162.6 cm (64.02\").    Weight as of this encounter: 71.1 kg (156 lb 12.8 oz).          Physical Exam  Vitals and nursing note reviewed.   Constitutional:       " Appearance: Normal appearance. She is well-developed.   HENT:      Head: Normocephalic and atraumatic.      Right Ear: External ear normal.      Left Ear: External ear normal.      Nose: Nose normal.   Eyes:      General: No scleral icterus.     Conjunctiva/sclera: Conjunctivae normal.   Cardiovascular:      Rate and Rhythm: Normal rate and regular rhythm.      Heart sounds: Normal heart sounds.   Pulmonary:      Effort: Pulmonary effort is normal.      Breath sounds: Normal breath sounds.   Musculoskeletal:      Cervical back: Normal range of motion and neck supple.      Left lower leg: No edema.   Lymphadenopathy:      Cervical: No cervical adenopathy.   Skin:     General: Skin is warm and dry.      Findings: No rash.   Neurological:      Mental Status: She is alert and oriented to person, place, and time.   Psychiatric:         Mood and Affect: Mood normal.         Behavior: Behavior normal.         Thought Content: Thought content normal.         Judgment: Judgment normal.        Result Review :  The following data was reviewed by: Radha Merrill DO on 01/20/2025:    TSH          7/11/2024    10:56   TSH   TSH 17.100                Assessment and Plan   Diagnoses and all orders for this visit:    1. Hypothyroidism due to Hashimoto's thyroiditis (Primary)  -     levothyroxine (SYNTHROID, LEVOTHROID) 50 MCG tablet; Take 1 tablet by mouth Daily.  Dispense: 90 tablet; Refill: 0    2. Viral upper respiratory tract infection      Patient is here to follow-up on hypothyroidism.  She has been noncompliant with taking her medication and with her follow-up.  I will restart levothyroxine 50 mcg daily and have her follow-up in 4 to 6 weeks.    Viral URI.  Patient is here today for an acute upper respiratory infection.  Patient was advised to drink plenty of fluids and rest.  Over-the-counter Zyrtec and Nasonex were recommended.  Patient was advised to monitor symptoms closely and follow-up or seek more urgent medical care  if worsening.  Also advised to continue wearing a mask until they are feeling better.          Follow Up   Return in about 6 weeks (around 3/3/2025) for Thyroid.  Patient was given instructions and counseling regarding her condition or for health maintenance advice. Please see specific information pulled into the AVS if appropriate.             Answers submitted by the patient for this visit:  Primary Reason for Visit (Submitted on 1/18/2025)  What is the primary reason for your visit?: Problem Not Listed

## 2025-03-03 ENCOUNTER — OFFICE VISIT (OUTPATIENT)
Dept: FAMILY MEDICINE CLINIC | Facility: CLINIC | Age: 55
End: 2025-03-03
Payer: COMMERCIAL

## 2025-03-03 VITALS
DIASTOLIC BLOOD PRESSURE: 58 MMHG | WEIGHT: 155.98 LBS | OXYGEN SATURATION: 97 % | SYSTOLIC BLOOD PRESSURE: 111 MMHG | HEART RATE: 59 BPM | BODY MASS INDEX: 26.63 KG/M2 | HEIGHT: 64 IN

## 2025-03-03 DIAGNOSIS — E06.3 HYPOTHYROIDISM DUE TO HASHIMOTO'S THYROIDITIS: Primary | ICD-10-CM

## 2025-03-03 LAB — TSH SERPL DL<=0.005 MIU/L-ACNC: 5.79 UIU/ML (ref 0.27–4.2)

## 2025-03-03 PROCEDURE — 99213 OFFICE O/P EST LOW 20 MIN: CPT | Performed by: FAMILY MEDICINE

## 2025-03-03 NOTE — PROGRESS NOTES
"Chief Complaint  Hypothyroidism    Subjective        Argelia Forte presents to [facility]  History of Present Illness  Patient is here today to follow-up on her chronic health conditions:    Hypothyroidism.  Patient currently is taking levothyroxine 50 mcg daily.  She has been more compliant with taking her medication over the last 6 weeks.  (Patient seems to feel best when her TSH is between 2 and 3.  She gets anxious when it is lower than that.)    Thyroid and Uti  Symptoms are: chronic.   Onset was more than 5 years.   Symptoms occur: constantly.  Symptoms include: nasal congestion.   Pertinent negative symptoms include no abdominal pain, no anorexia, no joint pain, no change in stool, no chest pain, no chills, no cough, no diaphoresis, no fatigue, no fever, no headaches, no joint swelling, no myalgias, no nausea, no neck pain, no numbness, no rash, no sore throat, no swollen glands, no dysuria, no vertigo, no visual change, no vomiting and no weakness.   Hypothyroidism  Patient reports no diaphoresis, fatigue or visual change.       Objective   Vital Signs:  /58   Pulse 59   Ht 162.6 cm (64.02\")   Wt 70.8 kg (155 lb 15.7 oz)   SpO2 97%   BMI 26.76 kg/m²   Estimated body mass index is 26.76 kg/m² as calculated from the following:    Height as of this encounter: 162.6 cm (64.02\").    Weight as of this encounter: 70.8 kg (155 lb 15.7 oz).          Physical Exam  Vitals and nursing note reviewed.   Constitutional:       Appearance: Normal appearance. She is well-developed and normal weight.   HENT:      Head: Normocephalic and atraumatic.      Right Ear: External ear normal.      Left Ear: External ear normal.      Nose: Nose normal.   Eyes:      General: No scleral icterus.     Conjunctiva/sclera: Conjunctivae normal.   Cardiovascular:      Rate and Rhythm: Normal rate and regular rhythm.      Heart sounds: Normal heart sounds.   Pulmonary:      Effort: Pulmonary effort is " normal.      Breath sounds: Normal breath sounds.   Musculoskeletal:      Cervical back: Normal range of motion and neck supple.      Right lower leg: No edema.      Left lower leg: No edema.   Lymphadenopathy:      Cervical: No cervical adenopathy.   Skin:     General: Skin is warm and dry.      Findings: No rash.   Neurological:      Mental Status: She is alert and oriented to person, place, and time.   Psychiatric:         Mood and Affect: Mood normal.         Behavior: Behavior normal.         Thought Content: Thought content normal.         Judgment: Judgment normal.        Result Review :  The following data was reviewed by: Radha Merrill DO on 03/03/2025:    TSH          7/11/2024    10:56   TSH   TSH 17.100                Assessment and Plan   Diagnoses and all orders for this visit:    1. Hypothyroidism due to Hashimoto's thyroiditis (Primary)  -     TSH    Patient is here today for chronic stable hypothyroidism.  Surveillance labs were obtained today and any medication changes will be made based on lab results and will be called to the patient later this week.          Follow Up   Return in about 14 weeks (around 6/9/2025) for Annual physical, Thyroid.  Patient was given instructions and counseling regarding her condition or for health maintenance advice. Please see specific information pulled into the AVS if appropriate.

## 2025-03-04 DIAGNOSIS — E06.3 HYPOTHYROIDISM DUE TO HASHIMOTO'S THYROIDITIS: ICD-10-CM

## 2025-03-04 RX ORDER — LEVOTHYROXINE SODIUM 50 UG/1
50 TABLET ORAL DAILY
Qty: 90 TABLET | Refills: 1 | Status: SHIPPED | OUTPATIENT
Start: 2025-03-04

## 2025-07-07 ENCOUNTER — OFFICE VISIT (OUTPATIENT)
Dept: FAMILY MEDICINE CLINIC | Facility: CLINIC | Age: 55
End: 2025-07-07
Payer: COMMERCIAL

## 2025-07-07 VITALS
WEIGHT: 152 LBS | BODY MASS INDEX: 25.95 KG/M2 | SYSTOLIC BLOOD PRESSURE: 122 MMHG | OXYGEN SATURATION: 100 % | HEART RATE: 63 BPM | DIASTOLIC BLOOD PRESSURE: 68 MMHG | HEIGHT: 64 IN

## 2025-07-07 DIAGNOSIS — M25.511 ACUTE PAIN OF RIGHT SHOULDER: Primary | ICD-10-CM

## 2025-07-07 DIAGNOSIS — M79.621 PAIN OF RIGHT UPPER ARM: ICD-10-CM

## 2025-07-07 PROCEDURE — 99213 OFFICE O/P EST LOW 20 MIN: CPT | Performed by: FAMILY MEDICINE

## 2025-07-25 DIAGNOSIS — E06.3 HYPOTHYROIDISM DUE TO HASHIMOTO'S THYROIDITIS: ICD-10-CM

## 2025-07-25 RX ORDER — LEVOTHYROXINE SODIUM 50 UG/1
50 TABLET ORAL DAILY
Qty: 90 TABLET | Refills: 0 | Status: SHIPPED | OUTPATIENT
Start: 2025-07-25

## 2025-07-25 NOTE — TELEPHONE ENCOUNTER
Caller: Argelia Forte    Relationship: Self    Requested Prescriptions:   Requested Prescriptions     Pending Prescriptions Disp Refills    levothyroxine (SYNTHROID, LEVOTHROID) 50 MCG tablet 90 tablet 1     Sig: Take 1 tablet by mouth Daily.      Pharmacy where request should be sent: [facility] [address] - [phone] PH - [phone] FX     Last office visit with prescribing clinician: 7/7/2025   Last telemedicine visit with prescribing clinician: Visit date not found   Next office visit with prescribing clinician: Visit date not found       Renetta Michel Rep   07/25/25 10:28 EDT

## 2025-07-30 ENCOUNTER — TELEPHONE (OUTPATIENT)
Dept: FAMILY MEDICINE CLINIC | Facility: CLINIC | Age: 55
End: 2025-07-30
Payer: COMMERCIAL